# Patient Record
Sex: FEMALE | Race: WHITE | NOT HISPANIC OR LATINO
[De-identification: names, ages, dates, MRNs, and addresses within clinical notes are randomized per-mention and may not be internally consistent; named-entity substitution may affect disease eponyms.]

---

## 2016-12-23 RX ORDER — SODIUM CHLORIDE 9 MG/ML
3 INJECTION INTRAMUSCULAR; INTRAVENOUS; SUBCUTANEOUS ONCE
Qty: 0 | Refills: 0 | Status: DISCONTINUED | OUTPATIENT
Start: 2017-01-03 | End: 2017-01-18

## 2017-01-03 ENCOUNTER — APPOINTMENT (OUTPATIENT)
Dept: UROGYNECOLOGY | Facility: HOSPITAL | Age: 82
End: 2017-01-03

## 2017-01-03 ENCOUNTER — OUTPATIENT (OUTPATIENT)
Dept: OUTPATIENT SERVICES | Facility: HOSPITAL | Age: 82
LOS: 1 days | End: 2017-01-03
Payer: MEDICARE

## 2017-01-03 VITALS
SYSTOLIC BLOOD PRESSURE: 109 MMHG | HEART RATE: 62 BPM | OXYGEN SATURATION: 93 % | RESPIRATION RATE: 16 BRPM | DIASTOLIC BLOOD PRESSURE: 59 MMHG

## 2017-01-03 VITALS
RESPIRATION RATE: 16 BRPM | WEIGHT: 175.05 LBS | SYSTOLIC BLOOD PRESSURE: 143 MMHG | HEIGHT: 70 IN | TEMPERATURE: 98 F | HEART RATE: 51 BPM | OXYGEN SATURATION: 97 % | DIASTOLIC BLOOD PRESSURE: 60 MMHG

## 2017-01-03 DIAGNOSIS — Z98.890 OTHER SPECIFIED POSTPROCEDURAL STATES: Chronic | ICD-10-CM

## 2017-01-03 DIAGNOSIS — Z90.49 ACQUIRED ABSENCE OF OTHER SPECIFIED PARTS OF DIGESTIVE TRACT: Chronic | ICD-10-CM

## 2017-01-03 DIAGNOSIS — N39.3 STRESS INCONTINENCE (FEMALE) (MALE): ICD-10-CM

## 2017-01-03 PROCEDURE — 57288 REPAIR BLADDER DEFECT: CPT

## 2017-01-03 PROCEDURE — C1771: CPT

## 2017-01-03 RX ORDER — SODIUM CHLORIDE 9 MG/ML
1000 INJECTION INTRAMUSCULAR; INTRAVENOUS; SUBCUTANEOUS
Qty: 0 | Refills: 0 | Status: DISCONTINUED | OUTPATIENT
Start: 2017-01-03 | End: 2017-01-03

## 2017-01-03 RX ORDER — CEFAZOLIN SODIUM 1 G
2000 VIAL (EA) INJECTION ONCE
Qty: 0 | Refills: 0 | Status: COMPLETED | OUTPATIENT
Start: 2017-01-03 | End: 2017-01-03

## 2017-01-03 RX ORDER — ONDANSETRON 8 MG/1
4 TABLET, FILM COATED ORAL ONCE
Qty: 0 | Refills: 0 | Status: DISCONTINUED | OUTPATIENT
Start: 2017-01-03 | End: 2017-01-03

## 2017-01-03 RX ORDER — OXYCODONE HYDROCHLORIDE 5 MG/1
1 TABLET ORAL
Qty: 15 | Refills: 0 | OUTPATIENT
Start: 2017-01-03

## 2017-01-03 RX ORDER — FENTANYL CITRATE 50 UG/ML
25 INJECTION INTRAVENOUS
Qty: 0 | Refills: 0 | Status: DISCONTINUED | OUTPATIENT
Start: 2017-01-03 | End: 2017-01-03

## 2017-01-03 RX ORDER — HYDROMORPHONE HYDROCHLORIDE 2 MG/ML
0.5 INJECTION INTRAMUSCULAR; INTRAVENOUS; SUBCUTANEOUS
Qty: 0 | Refills: 0 | Status: DISCONTINUED | OUTPATIENT
Start: 2017-01-03 | End: 2017-01-03

## 2017-01-03 RX ADMIN — Medication 100 MILLIGRAM(S): at 12:11

## 2017-01-03 NOTE — ASU DISCHARGE PLAN (ADULT/PEDIATRIC). - MEDICATION SUMMARY - MEDICATIONS TO TAKE
I will START or STAY ON the medications listed below when I get home from the hospital:    aspirin 81 mg oral delayed release tablet  -- 1 tab(s) by mouth once a day  -- Indication: For as per pmd     losartan 50 mg oral tablet  -- 1 tab(s) by mouth once a day  -- Indication: For as per pmd     simvastatin 20 mg oral tablet  -- 1 tab(s) by mouth once a day (at bedtime)  -- Indication: For as prior to surgery     amLODIPine 5 mg oral tablet  -- 1 tab(s) by mouth once a day  -- Indication: For as per pmd     Zantac 150 oral tablet  -- 1 tab(s) by mouth once a day  -- Indication: For as prior to surgery     Fish Oil 1000 mg oral capsule  -- 1 cap(s) by mouth once a day  -- Indication: For Supplement     PriLOSEC 40 mg oral delayed release capsule  -- 1 cap(s) by mouth once a day  -- Indication: For as per pmd     Vitamin D3 1000 intl units oral tablet  -- 1 tab(s) by mouth once a day  -- Indication: For Supplement

## 2017-01-03 NOTE — BRIEF OPERATIVE NOTE - PROCEDURE
Sling operation for female stress incontinence  01/03/2017  retropubic sling  Active  [facility]

## 2017-01-19 ENCOUNTER — APPOINTMENT (OUTPATIENT)
Dept: UROGYNECOLOGY | Facility: CLINIC | Age: 82
End: 2017-01-19

## 2017-01-19 ENCOUNTER — RESULT CHARGE (OUTPATIENT)
Age: 82
End: 2017-01-19

## 2017-01-19 VITALS
DIASTOLIC BLOOD PRESSURE: 76 MMHG | WEIGHT: 178 LBS | BODY MASS INDEX: 34.95 KG/M2 | SYSTOLIC BLOOD PRESSURE: 182 MMHG | HEIGHT: 60 IN

## 2017-01-19 LAB
BILIRUB UR QL STRIP: NEGATIVE
CLARITY UR: CLEAR
COLLECTION METHOD: NORMAL
GLUCOSE UR-MCNC: NEGATIVE
HCG UR QL: 0.2 EU/DL
HGB UR QL STRIP.AUTO: NORMAL
KETONES UR-MCNC: NEGATIVE
LEUKOCYTE ESTERASE UR QL STRIP: NORMAL
NITRITE UR QL STRIP: NEGATIVE
PH UR STRIP: 6.5
PROT UR STRIP-MCNC: NEGATIVE
SP GR UR STRIP: 1.01

## 2017-02-16 ENCOUNTER — APPOINTMENT (OUTPATIENT)
Dept: UROGYNECOLOGY | Facility: CLINIC | Age: 82
End: 2017-02-16
Payer: MEDICARE

## 2017-02-16 VITALS
SYSTOLIC BLOOD PRESSURE: 167 MMHG | WEIGHT: 178 LBS | BODY MASS INDEX: 34.95 KG/M2 | HEIGHT: 60 IN | DIASTOLIC BLOOD PRESSURE: 85 MMHG

## 2017-02-16 DIAGNOSIS — N39.3 STRESS INCONTINENCE (FEMALE) (MALE): ICD-10-CM

## 2017-02-16 DIAGNOSIS — R39.15 URGENCY OF URINATION: ICD-10-CM

## 2017-02-16 LAB
BILIRUB UR QL STRIP: NEGATIVE
GLUCOSE UR-MCNC: NEGATIVE
HCG UR QL: 0.2 EU/DL
HGB UR QL STRIP.AUTO: NEGATIVE
KETONES UR-MCNC: NEGATIVE
LEUKOCYTE ESTERASE UR QL STRIP: NORMAL
NITRITE UR QL STRIP: NEGATIVE
PH UR STRIP: 6
PROT UR STRIP-MCNC: NEGATIVE
SP GR UR STRIP: 1.01

## 2017-02-16 PROCEDURE — 99024 POSTOP FOLLOW-UP VISIT: CPT

## 2017-02-16 PROCEDURE — 81003 URINALYSIS AUTO W/O SCOPE: CPT | Mod: QW

## 2017-04-10 ENCOUNTER — RX RENEWAL (OUTPATIENT)
Age: 82
End: 2017-04-10

## 2017-05-04 ENCOUNTER — APPOINTMENT (OUTPATIENT)
Dept: UROGYNECOLOGY | Facility: CLINIC | Age: 82
End: 2017-05-04

## 2017-05-04 VITALS
SYSTOLIC BLOOD PRESSURE: 170 MMHG | DIASTOLIC BLOOD PRESSURE: 69 MMHG | BODY MASS INDEX: 33.61 KG/M2 | HEIGHT: 61 IN | WEIGHT: 178 LBS

## 2017-05-04 LAB
BILIRUB UR QL STRIP: NEGATIVE
CLARITY UR: CLEAR
COLLECTION METHOD: NORMAL
GLUCOSE UR-MCNC: NEGATIVE
HCG UR QL: 0.2 EU/DL
HGB UR QL STRIP.AUTO: NORMAL
KETONES UR-MCNC: NEGATIVE
LEUKOCYTE ESTERASE UR QL STRIP: NEGATIVE
NITRITE UR QL STRIP: NEGATIVE
PH UR STRIP: 6
PROT UR STRIP-MCNC: NEGATIVE
SP GR UR STRIP: 1

## 2017-05-09 ENCOUNTER — APPOINTMENT (OUTPATIENT)
Dept: PULMONOLOGY | Facility: CLINIC | Age: 82
End: 2017-05-09

## 2017-05-09 VITALS
SYSTOLIC BLOOD PRESSURE: 138 MMHG | HEART RATE: 53 BPM | DIASTOLIC BLOOD PRESSURE: 80 MMHG | OXYGEN SATURATION: 95 % | BODY MASS INDEX: 33.07 KG/M2 | WEIGHT: 175 LBS | RESPIRATION RATE: 16 BRPM

## 2017-05-09 DIAGNOSIS — R91.1 SOLITARY PULMONARY NODULE: ICD-10-CM

## 2017-05-09 RX ORDER — OXYCODONE AND ACETAMINOPHEN 5; 325 MG/1; MG/1
5-325 TABLET ORAL
Qty: 15 | Refills: 0 | Status: DISCONTINUED | COMMUNITY
Start: 2017-01-03 | End: 2017-05-09

## 2017-06-15 ENCOUNTER — RX RENEWAL (OUTPATIENT)
Age: 82
End: 2017-06-15

## 2017-06-22 ENCOUNTER — RX RENEWAL (OUTPATIENT)
Age: 82
End: 2017-06-22

## 2017-06-28 RX ORDER — SOLIFENACIN SUCCINATE 5 MG/1
5 TABLET, FILM COATED ORAL
Qty: 30 | Refills: 3 | Status: DISCONTINUED | COMMUNITY
Start: 2017-06-22 | End: 2017-06-28

## 2017-06-28 RX ORDER — OXYBUTYNIN CHLORIDE 5 MG/1
5 TABLET, EXTENDED RELEASE ORAL
Qty: 30 | Refills: 2 | Status: DISCONTINUED | COMMUNITY
Start: 2017-02-16 | End: 2017-06-28

## 2017-10-05 ENCOUNTER — APPOINTMENT (OUTPATIENT)
Dept: UROGYNECOLOGY | Facility: CLINIC | Age: 82
End: 2017-10-05
Payer: MEDICARE

## 2017-10-05 DIAGNOSIS — N39.41 URGE INCONTINENCE: ICD-10-CM

## 2017-10-05 DIAGNOSIS — R35.0 FREQUENCY OF MICTURITION: ICD-10-CM

## 2017-10-05 LAB
BILIRUB UR QL STRIP: NEGATIVE
CLARITY UR: CLEAR
COLLECTION METHOD: NORMAL
GLUCOSE UR-MCNC: NEGATIVE
HCG UR QL: 0.2 EU/DL
HGB UR QL STRIP.AUTO: NEGATIVE
KETONES UR-MCNC: NEGATIVE
LEUKOCYTE ESTERASE UR QL STRIP: NEGATIVE
NITRITE UR QL STRIP: NEGATIVE
PH UR STRIP: 5.5
PROT UR STRIP-MCNC: NEGATIVE
SP GR UR STRIP: <=1.005

## 2017-10-05 PROCEDURE — 99213 OFFICE O/P EST LOW 20 MIN: CPT | Mod: 25

## 2017-10-05 PROCEDURE — 51798 US URINE CAPACITY MEASURE: CPT

## 2017-10-05 PROCEDURE — 81003 URINALYSIS AUTO W/O SCOPE: CPT | Mod: QW

## 2018-05-02 ENCOUNTER — APPOINTMENT (OUTPATIENT)
Dept: PULMONOLOGY | Facility: CLINIC | Age: 83
End: 2018-05-02
Payer: MEDICARE

## 2018-05-02 VITALS
DIASTOLIC BLOOD PRESSURE: 78 MMHG | WEIGHT: 171.5 LBS | SYSTOLIC BLOOD PRESSURE: 122 MMHG | BODY MASS INDEX: 32.4 KG/M2 | OXYGEN SATURATION: 95 % | HEART RATE: 62 BPM

## 2018-05-02 VITALS — RESPIRATION RATE: 16 BRPM

## 2018-05-02 PROCEDURE — 99214 OFFICE O/P EST MOD 30 MIN: CPT

## 2018-05-02 RX ORDER — PENICILLIN V POTASSIUM 500 MG/1
500 TABLET, FILM COATED ORAL
Qty: 21 | Refills: 0 | Status: DISCONTINUED | COMMUNITY
Start: 2018-04-06

## 2018-05-02 RX ORDER — CEFDINIR 300 MG/1
300 CAPSULE ORAL
Qty: 20 | Refills: 0 | Status: DISCONTINUED | COMMUNITY
Start: 2018-01-03

## 2018-06-05 ENCOUNTER — APPOINTMENT (OUTPATIENT)
Dept: BREAST CENTER | Facility: CLINIC | Age: 83
End: 2018-06-05
Payer: MEDICARE

## 2018-06-05 VITALS
HEIGHT: 61 IN | SYSTOLIC BLOOD PRESSURE: 142 MMHG | BODY MASS INDEX: 31.34 KG/M2 | WEIGHT: 166 LBS | DIASTOLIC BLOOD PRESSURE: 80 MMHG | HEART RATE: 68 BPM

## 2018-06-05 DIAGNOSIS — Z80.1 FAMILY HISTORY OF MALIGNANT NEOPLASM OF TRACHEA, BRONCHUS AND LUNG: ICD-10-CM

## 2018-06-05 DIAGNOSIS — Z87.19 PERSONAL HISTORY OF OTHER DISEASES OF THE DIGESTIVE SYSTEM: ICD-10-CM

## 2018-06-05 DIAGNOSIS — Z87.39 PERSONAL HISTORY OF OTHER DISEASES OF THE MUSCULOSKELETAL SYSTEM AND CONNECTIVE TISSUE: ICD-10-CM

## 2018-06-05 PROCEDURE — 99205 OFFICE O/P NEW HI 60 MIN: CPT

## 2018-06-05 RX ORDER — OMEGA-3/DHA/EPA/FISH OIL 300-1000MG
1000 CAPSULE ORAL
Refills: 0 | Status: ACTIVE | COMMUNITY

## 2018-06-05 RX ORDER — BENZONATATE 100 MG/1
100 CAPSULE ORAL
Qty: 21 | Refills: 0 | Status: DISCONTINUED | COMMUNITY
Start: 2017-09-25 | End: 2018-06-05

## 2018-06-08 ENCOUNTER — RESULT REVIEW (OUTPATIENT)
Age: 83
End: 2018-06-08

## 2018-06-08 ENCOUNTER — OUTPATIENT (OUTPATIENT)
Dept: OUTPATIENT SERVICES | Facility: HOSPITAL | Age: 83
LOS: 1 days | Discharge: HOME | End: 2018-06-08
Payer: MEDICARE

## 2018-06-08 DIAGNOSIS — C50.111 MALIGNANT NEOPLASM OF CENTRAL PORTION OF RIGHT FEMALE BREAST: ICD-10-CM

## 2018-06-08 DIAGNOSIS — Z98.890 OTHER SPECIFIED POSTPROCEDURAL STATES: Chronic | ICD-10-CM

## 2018-06-08 DIAGNOSIS — Z90.49 ACQUIRED ABSENCE OF OTHER SPECIFIED PARTS OF DIGESTIVE TRACT: Chronic | ICD-10-CM

## 2018-06-08 PROCEDURE — 88321 CONSLTJ&REPRT SLD PREP ELSWR: CPT

## 2018-06-11 LAB — SURGICAL PATHOLOGY FINAL REPORT - CH: SIGNIFICANT CHANGE UP

## 2018-06-12 ENCOUNTER — OUTPATIENT (OUTPATIENT)
Dept: OUTPATIENT SERVICES | Facility: HOSPITAL | Age: 83
LOS: 1 days | Discharge: HOME | End: 2018-06-12
Payer: MEDICARE

## 2018-06-12 DIAGNOSIS — Z98.890 OTHER SPECIFIED POSTPROCEDURAL STATES: Chronic | ICD-10-CM

## 2018-06-12 DIAGNOSIS — E78.00 PURE HYPERCHOLESTEROLEMIA, UNSPECIFIED: ICD-10-CM

## 2018-06-12 DIAGNOSIS — N60.31 FIBROSCLEROSIS OF RIGHT BREAST: ICD-10-CM

## 2018-06-12 DIAGNOSIS — C50.911 MALIGNANT NEOPLASM OF UNSPECIFIED SITE OF RIGHT FEMALE BREAST: ICD-10-CM

## 2018-06-12 DIAGNOSIS — C50.111 MALIGNANT NEOPLASM OF CENTRAL PORTION OF RIGHT FEMALE BREAST: ICD-10-CM

## 2018-06-12 DIAGNOSIS — G47.33 OBSTRUCTIVE SLEEP APNEA (ADULT) (PEDIATRIC): ICD-10-CM

## 2018-06-12 DIAGNOSIS — Z98.49 CATARACT EXTRACTION STATUS, UNSPECIFIED EYE: Chronic | ICD-10-CM

## 2018-06-12 DIAGNOSIS — Z01.818 ENCOUNTER FOR OTHER PREPROCEDURAL EXAMINATION: ICD-10-CM

## 2018-06-12 DIAGNOSIS — K59.00 CONSTIPATION, UNSPECIFIED: ICD-10-CM

## 2018-06-12 DIAGNOSIS — R91.1 SOLITARY PULMONARY NODULE: ICD-10-CM

## 2018-06-12 DIAGNOSIS — E66.9 OBESITY, UNSPECIFIED: ICD-10-CM

## 2018-06-12 DIAGNOSIS — Z90.49 ACQUIRED ABSENCE OF OTHER SPECIFIED PARTS OF DIGESTIVE TRACT: Chronic | ICD-10-CM

## 2018-06-12 LAB
ANION GAP SERPL CALC-SCNC: 6 MMOL/L — SIGNIFICANT CHANGE UP (ref 5–17)
BASOPHILS # BLD AUTO: 0.02 K/UL — SIGNIFICANT CHANGE UP (ref 0–0.2)
BASOPHILS NFR BLD AUTO: 0.3 % — SIGNIFICANT CHANGE UP (ref 0–2)
BUN SERPL-MCNC: 17 MG/DL — SIGNIFICANT CHANGE UP (ref 7–23)
CALCIUM SERPL-MCNC: 8.9 MG/DL — SIGNIFICANT CHANGE UP (ref 8.5–10.1)
CHLORIDE SERPL-SCNC: 107 MMOL/L — SIGNIFICANT CHANGE UP (ref 96–108)
CO2 SERPL-SCNC: 29 MMOL/L — SIGNIFICANT CHANGE UP (ref 22–31)
CREAT SERPL-MCNC: 0.68 MG/DL — SIGNIFICANT CHANGE UP (ref 0.5–1.3)
EOSINOPHIL # BLD AUTO: 0.12 K/UL — SIGNIFICANT CHANGE UP (ref 0–0.5)
EOSINOPHIL NFR BLD AUTO: 1.7 % — SIGNIFICANT CHANGE UP (ref 0–6)
GLUCOSE SERPL-MCNC: 98 MG/DL — SIGNIFICANT CHANGE UP (ref 70–99)
HCT VFR BLD CALC: 43 % — SIGNIFICANT CHANGE UP (ref 34.5–45)
HGB BLD-MCNC: 14.4 G/DL — SIGNIFICANT CHANGE UP (ref 11.5–15.5)
IMM GRANULOCYTES NFR BLD AUTO: 0.3 % — SIGNIFICANT CHANGE UP (ref 0–1.5)
LYMPHOCYTES # BLD AUTO: 1.99 K/UL — SIGNIFICANT CHANGE UP (ref 1–3.3)
LYMPHOCYTES # BLD AUTO: 28.2 % — SIGNIFICANT CHANGE UP (ref 13–44)
MCHC RBC-ENTMCNC: 29.6 PG — SIGNIFICANT CHANGE UP (ref 27–34)
MCHC RBC-ENTMCNC: 33.5 GM/DL — SIGNIFICANT CHANGE UP (ref 32–36)
MCV RBC AUTO: 88.5 FL — SIGNIFICANT CHANGE UP (ref 80–100)
MONOCYTES # BLD AUTO: 0.49 K/UL — SIGNIFICANT CHANGE UP (ref 0–0.9)
MONOCYTES NFR BLD AUTO: 7 % — SIGNIFICANT CHANGE UP (ref 2–14)
NEUTROPHILS # BLD AUTO: 4.41 K/UL — SIGNIFICANT CHANGE UP (ref 1.8–7.4)
NEUTROPHILS NFR BLD AUTO: 62.5 % — SIGNIFICANT CHANGE UP (ref 43–77)
NRBC # BLD: 0 /100 WBCS — SIGNIFICANT CHANGE UP (ref 0–0)
PLATELET # BLD AUTO: 164 K/UL — SIGNIFICANT CHANGE UP (ref 150–400)
POTASSIUM SERPL-MCNC: 4.1 MMOL/L — SIGNIFICANT CHANGE UP (ref 3.5–5.3)
POTASSIUM SERPL-SCNC: 4.1 MMOL/L — SIGNIFICANT CHANGE UP (ref 3.5–5.3)
RBC # BLD: 4.86 M/UL — SIGNIFICANT CHANGE UP (ref 3.8–5.2)
RBC # FLD: 13.3 % — SIGNIFICANT CHANGE UP (ref 10.3–14.5)
SODIUM SERPL-SCNC: 142 MMOL/L — SIGNIFICANT CHANGE UP (ref 135–145)
WBC # BLD: 7.05 K/UL — SIGNIFICANT CHANGE UP (ref 3.8–10.5)
WBC # FLD AUTO: 7.05 K/UL — SIGNIFICANT CHANGE UP (ref 3.8–10.5)

## 2018-06-12 PROCEDURE — 71046 X-RAY EXAM CHEST 2 VIEWS: CPT | Mod: 26

## 2018-06-12 PROCEDURE — 93010 ELECTROCARDIOGRAM REPORT: CPT

## 2018-06-12 NOTE — ASU PATIENT PROFILE, ADULT - PATIENT'S HEIGHT AND WEIGHT RECORDED IN THE VITAL SIGNS FLOWSHEET
HT 60 inches WY 173lbs  BP  183/68  T 98.2  P 52  R 20  O2sat 100%/yes yes/HT 60 inches WT 173lbs  BP  183/68  T 98.2  P 52  R 20  O2sat 100%

## 2018-06-12 NOTE — ASU PATIENT PROFILE, ADULT - PSH
H/O bilateral breast biopsy  1995 benign  H/O colonoscopy with polypectomy    History of bladder surgery  sling 2016  History of cataract surgery  b/l  History of esophagogastroduodenoscopy (EGD)    S/P appendectomy    S/P carpal tunnel release  right hand 10/2016  S/P coronary angiogram  catherization done 8/12/2016 no stents

## 2018-06-12 NOTE — CHART NOTE - NSCHARTNOTEFT_GEN_A_CORE
Plan  1. Stop all NSAIDS, herbal supplements and vitamins for 7 days.  2. NPO at midnight.  3. Take the following medications ( amlodipine, prilosec, losartan ) with small sips of water on the morning of your procedure/surgery.  4. Use EZ sponges as directed

## 2018-06-12 NOTE — ASU PATIENT PROFILE, ADULT - PMH
Breast calcifications    Breast neoplasm  right  GERD (gastroesophageal reflux disease)    Heart murmur    Hyperlipidemia    Hypertension    OA (osteoarthritis)    Obesity    JORGE on CPAP    Rhinitis    Stress incontinence in female

## 2018-06-14 DIAGNOSIS — C50.111 MALIGNANT NEOPLASM OF CENTRAL PORTION OF RIGHT FEMALE BREAST: ICD-10-CM

## 2018-06-19 RX ORDER — FENTANYL CITRATE 50 UG/ML
50 INJECTION INTRAVENOUS
Qty: 0 | Refills: 0 | Status: DISCONTINUED | OUTPATIENT
Start: 2018-06-20 | End: 2018-06-20

## 2018-06-19 RX ORDER — OXYCODONE HYDROCHLORIDE 5 MG/1
10 TABLET ORAL EVERY 6 HOURS
Qty: 0 | Refills: 0 | Status: DISCONTINUED | OUTPATIENT
Start: 2018-06-20 | End: 2018-06-20

## 2018-06-20 ENCOUNTER — RESULT REVIEW (OUTPATIENT)
Age: 83
End: 2018-06-20

## 2018-06-20 ENCOUNTER — APPOINTMENT (OUTPATIENT)
Dept: BREAST CENTER | Facility: HOSPITAL | Age: 83
End: 2018-06-20
Payer: MEDICARE

## 2018-06-20 ENCOUNTER — OUTPATIENT (OUTPATIENT)
Dept: OUTPATIENT SERVICES | Facility: HOSPITAL | Age: 83
LOS: 1 days | Discharge: HOME | End: 2018-06-20
Payer: MEDICARE

## 2018-06-20 VITALS
RESPIRATION RATE: 15 BRPM | HEART RATE: 68 BPM | OXYGEN SATURATION: 95 % | DIASTOLIC BLOOD PRESSURE: 73 MMHG | TEMPERATURE: 98 F | SYSTOLIC BLOOD PRESSURE: 138 MMHG

## 2018-06-20 VITALS
OXYGEN SATURATION: 97 % | HEART RATE: 52 BPM | TEMPERATURE: 98 F | DIASTOLIC BLOOD PRESSURE: 64 MMHG | WEIGHT: 164.02 LBS | SYSTOLIC BLOOD PRESSURE: 142 MMHG | RESPIRATION RATE: 16 BRPM | HEIGHT: 61 IN

## 2018-06-20 DIAGNOSIS — Z98.890 OTHER SPECIFIED POSTPROCEDURAL STATES: Chronic | ICD-10-CM

## 2018-06-20 DIAGNOSIS — Z98.49 CATARACT EXTRACTION STATUS, UNSPECIFIED EYE: Chronic | ICD-10-CM

## 2018-06-20 DIAGNOSIS — Z90.49 ACQUIRED ABSENCE OF OTHER SPECIFIED PARTS OF DIGESTIVE TRACT: Chronic | ICD-10-CM

## 2018-06-20 PROCEDURE — 19301 PARTIAL MASTECTOMY: CPT | Mod: RT

## 2018-06-20 PROCEDURE — 88305 TISSUE EXAM BY PATHOLOGIST: CPT | Mod: 26

## 2018-06-20 PROCEDURE — 88307 TISSUE EXAM BY PATHOLOGIST: CPT | Mod: 26

## 2018-06-20 PROCEDURE — 38525 BIOPSY/REMOVAL LYMPH NODES: CPT | Mod: RT

## 2018-06-20 PROCEDURE — 88329 PATH CONSLTJ DRG SURG: CPT

## 2018-06-20 PROCEDURE — 38792 RA TRACER ID OF SENTINL NODE: CPT | Mod: RT

## 2018-06-20 PROCEDURE — 78806: CPT | Mod: 26

## 2018-06-20 RX ORDER — ONDANSETRON 8 MG/1
4 TABLET, FILM COATED ORAL ONCE
Qty: 0 | Refills: 0 | Status: DISCONTINUED | OUTPATIENT
Start: 2018-06-20 | End: 2018-06-20

## 2018-06-20 RX ORDER — OXYCODONE AND ACETAMINOPHEN 5; 325 MG/1; MG/1
1 TABLET ORAL EVERY 4 HOURS
Qty: 0 | Refills: 0 | Status: DISCONTINUED | OUTPATIENT
Start: 2018-06-20 | End: 2018-06-20

## 2018-06-20 RX ORDER — OXYCODONE AND ACETAMINOPHEN 5; 325 MG/1; MG/1
2 TABLET ORAL EVERY 6 HOURS
Qty: 0 | Refills: 0 | Status: DISCONTINUED | OUTPATIENT
Start: 2018-06-20 | End: 2018-06-20

## 2018-06-20 RX ORDER — SODIUM CHLORIDE 9 MG/ML
1000 INJECTION, SOLUTION INTRAVENOUS
Qty: 0 | Refills: 0 | Status: DISCONTINUED | OUTPATIENT
Start: 2018-06-20 | End: 2018-06-20

## 2018-06-20 RX ORDER — CETIRIZINE HYDROCHLORIDE 10 MG/1
1 TABLET ORAL
Qty: 0 | Refills: 0 | COMMUNITY

## 2018-06-20 RX ORDER — ONDANSETRON 8 MG/1
4 TABLET, FILM COATED ORAL EVERY 6 HOURS
Qty: 0 | Refills: 0 | Status: DISCONTINUED | OUTPATIENT
Start: 2018-06-20 | End: 2018-07-05

## 2018-06-20 RX ORDER — OXYCODONE HYDROCHLORIDE 5 MG/1
1 TABLET ORAL
Qty: 20 | Refills: 0 | OUTPATIENT
Start: 2018-06-20

## 2018-06-20 RX ADMIN — OXYCODONE HYDROCHLORIDE 10 MILLIGRAM(S): 5 TABLET ORAL at 14:44

## 2018-06-20 RX ADMIN — FENTANYL CITRATE 50 MICROGRAM(S): 50 INJECTION INTRAVENOUS at 14:43

## 2018-06-20 NOTE — ASU DISCHARGE PLAN (ADULT/PEDIATRIC). - MEDICATION SUMMARY - MEDICATIONS TO STOP TAKING
I will STOP taking the medications listed below when I get home from the hospital:    aspirin 81 mg oral delayed release tablet  -- 1 tab(s) by mouth once a day  hold preop as per surgeon and PMD    Fish Oil 1000 mg oral capsule  -- 1 cap(s) by mouth once a day    Aleve 220 mg oral tablet  -- 1 tab(s) by mouth once a day, As Needed    hold 1 week before surgery

## 2018-06-20 NOTE — ASU DISCHARGE PLAN (ADULT/PEDIATRIC). - NOTIFY
Pain not relieved by Medications/Persistent Nausea and Vomiting/Unable to Urinate/Fever greater than 101/Bleeding that does not stop

## 2018-06-20 NOTE — BRIEF OPERATIVE NOTE - PROCEDURE
<<-----Click on this checkbox to enter Procedure [place] node mapping with biopsy  06/20/2018  Right axilla  Active  [facility]  Lumpectomy for malignant neoplasm of breast  06[facility]20/2018  [facility]

## 2018-06-20 NOTE — ASU DISCHARGE PLAN (ADULT/PEDIATRIC). - MEDICATION SUMMARY - MEDICATIONS TO TAKE
I will START or STAY ON the medications listed below when I get home from the hospital:    oxyCODONE 5 mg oral tablet  -- 1 -2 tab(s) by mouth every 4 hours, As Needed -for moderate pain MDD:12  -- Caution federal law prohibits the transfer of this drug to any person other  than the person for whom it was prescribed.  It is very important that you take or use this exactly as directed.  Do not skip doses or discontinue unless directed by your doctor.  May cause drowsiness.  Alcohol may intensify this effect.  Use care when operating dangerous machinery.  This prescription cannot be refilled.  Using more of this medication than prescribed may cause serious breathing problems.    -- Indication: For postop pain    losartan 50 mg oral tablet  -- 1 tab(s) by mouth once a day  -- Indication: For blood pressure    ZyrTEC 5 mg oral tablet  -- 1 tab(s) by mouth once a day, As Needed  -- Indication: For allergies    simvastatin 20 mg oral tablet  -- 1 tab(s) by mouth once a day (at bedtime)  -- Indication: For cholesterol    amLODIPine 5 mg oral tablet  -- 1 tab(s) by mouth once a day  -- Indication: For blood pressure    Zantac 150 oral tablet  -- 1 tab(s) by mouth once a day  -- Indication: For reflux    PriLOSEC 40 mg oral delayed release capsule  -- 1 cap(s) by mouth once a day  -- Indication: For reflux    Citracal + D  -- 1 tab(s) by mouth once a day  -- Indication: For supplement    Vitamin D3 1000 intl units oral tablet  -- 1 tab(s) by mouth once a day  -- Indication: For  vitamin

## 2018-06-20 NOTE — BRIEF OPERATIVE NOTE - SPECIMENS
Right axillary sentinel nodes x 2, nonsentinel node x 1.   Right lumpectomy. Lumpectomy shave margins x 6.

## 2018-06-25 LAB — SURGICAL PATHOLOGY FINAL REPORT - CH: SIGNIFICANT CHANGE UP

## 2018-06-28 ENCOUNTER — APPOINTMENT (OUTPATIENT)
Dept: BREAST CENTER | Facility: CLINIC | Age: 83
End: 2018-06-28
Payer: MEDICARE

## 2018-06-28 PROCEDURE — 99024 POSTOP FOLLOW-UP VISIT: CPT

## 2018-06-29 DIAGNOSIS — N60.31 FIBROSCLEROSIS OF RIGHT BREAST: ICD-10-CM

## 2018-06-29 DIAGNOSIS — G47.33 OBSTRUCTIVE SLEEP APNEA (ADULT) (PEDIATRIC): ICD-10-CM

## 2018-06-29 DIAGNOSIS — Z79.82 LONG TERM (CURRENT) USE OF ASPIRIN: ICD-10-CM

## 2018-06-29 DIAGNOSIS — Z90.49 ACQUIRED ABSENCE OF OTHER SPECIFIED PARTS OF DIGESTIVE TRACT: ICD-10-CM

## 2018-06-29 DIAGNOSIS — C50.911 MALIGNANT NEOPLASM OF UNSPECIFIED SITE OF RIGHT FEMALE BREAST: ICD-10-CM

## 2018-06-29 DIAGNOSIS — E78.00 PURE HYPERCHOLESTEROLEMIA, UNSPECIFIED: ICD-10-CM

## 2018-06-29 DIAGNOSIS — K59.00 CONSTIPATION, UNSPECIFIED: ICD-10-CM

## 2018-06-29 DIAGNOSIS — R91.1 SOLITARY PULMONARY NODULE: ICD-10-CM

## 2018-06-29 DIAGNOSIS — Z79.1 LONG TERM (CURRENT) USE OF NON-STEROIDAL ANTI-INFLAMMATORIES (NSAID): ICD-10-CM

## 2018-06-29 DIAGNOSIS — I10 ESSENTIAL (PRIMARY) HYPERTENSION: ICD-10-CM

## 2018-06-29 DIAGNOSIS — Z80.1 FAMILY HISTORY OF MALIGNANT NEOPLASM OF TRACHEA, BRONCHUS AND LUNG: ICD-10-CM

## 2018-06-29 DIAGNOSIS — Z80.3 FAMILY HISTORY OF MALIGNANT NEOPLASM OF BREAST: ICD-10-CM

## 2018-06-29 DIAGNOSIS — E66.9 OBESITY, UNSPECIFIED: ICD-10-CM

## 2018-06-29 DIAGNOSIS — K21.9 GASTRO-ESOPHAGEAL REFLUX DISEASE WITHOUT ESOPHAGITIS: ICD-10-CM

## 2018-06-29 DIAGNOSIS — M19.90 UNSPECIFIED OSTEOARTHRITIS, UNSPECIFIED SITE: ICD-10-CM

## 2018-07-24 ENCOUNTER — MEDICATION RENEWAL (OUTPATIENT)
Age: 83
End: 2018-07-24

## 2018-07-25 ENCOUNTER — RESULT REVIEW (OUTPATIENT)
Age: 83
End: 2018-07-25

## 2018-07-27 PROBLEM — Z98.890 POST-OPERATIVE STATE: Status: RESOLVED | Noted: 2017-01-19 | Resolved: 2018-07-27

## 2018-07-27 PROBLEM — Z80.2 FAMILY HISTORY OF MALIGNANT NEOPLASM OF LARYNX: Status: ACTIVE | Noted: 2018-07-27

## 2018-07-30 ENCOUNTER — APPOINTMENT (OUTPATIENT)
Dept: HEMATOLOGY ONCOLOGY | Facility: CLINIC | Age: 83
End: 2018-07-30
Payer: MEDICARE

## 2018-07-30 VITALS
BODY MASS INDEX: 32.66 KG/M2 | WEIGHT: 173 LBS | HEART RATE: 54 BPM | DIASTOLIC BLOOD PRESSURE: 68 MMHG | HEIGHT: 61 IN | TEMPERATURE: 98.4 F | SYSTOLIC BLOOD PRESSURE: 174 MMHG

## 2018-07-30 DIAGNOSIS — Z80.3 FAMILY HISTORY OF MALIGNANT NEOPLASM OF BREAST: ICD-10-CM

## 2018-07-30 DIAGNOSIS — Z98.890 OTHER SPECIFIED POSTPROCEDURAL STATES: ICD-10-CM

## 2018-07-30 DIAGNOSIS — Z87.19 PERSONAL HISTORY OF OTHER DISEASES OF THE DIGESTIVE SYSTEM: ICD-10-CM

## 2018-07-30 DIAGNOSIS — Z87.09 PERSONAL HISTORY OF OTHER DISEASES OF THE RESPIRATORY SYSTEM: ICD-10-CM

## 2018-07-30 DIAGNOSIS — Z80.2 FAMILY HISTORY OF MALIGNANT NEOPLASM OF OTHER RESPIRATORY AND INTRATHORACIC ORGANS: ICD-10-CM

## 2018-07-30 PROBLEM — M19.90 UNSPECIFIED OSTEOARTHRITIS, UNSPECIFIED SITE: Chronic | Status: ACTIVE | Noted: 2018-06-12

## 2018-07-30 PROBLEM — E66.9 OBESITY, UNSPECIFIED: Chronic | Status: ACTIVE | Noted: 2018-06-12

## 2018-07-30 PROBLEM — D49.3 NEOPLASM OF UNSPECIFIED BEHAVIOR OF BREAST: Chronic | Status: ACTIVE | Noted: 2018-06-12

## 2018-07-30 PROCEDURE — 99204 OFFICE O/P NEW MOD 45 MIN: CPT

## 2018-07-30 RX ORDER — OXYBUTYNIN CHLORIDE 5 MG/1
5 TABLET, EXTENDED RELEASE ORAL
Qty: 30 | Refills: 0 | Status: DISCONTINUED | COMMUNITY
Start: 2017-06-28 | End: 2018-07-30

## 2018-07-30 RX ORDER — CODEINE PHOSPHATE AND GUAIFENESIN 10; 100 MG/5ML; MG/5ML
100-10 LIQUID ORAL
Qty: 240 | Refills: 0 | Status: DISCONTINUED | COMMUNITY
Start: 2018-01-03 | End: 2018-07-30

## 2018-08-02 PROBLEM — Z80.3 FH: BREAST CANCER: Status: ACTIVE | Noted: 2018-06-05

## 2018-08-31 ENCOUNTER — APPOINTMENT (OUTPATIENT)
Dept: HEMATOLOGY ONCOLOGY | Facility: CLINIC | Age: 83
End: 2018-08-31
Payer: MEDICARE

## 2018-09-17 ENCOUNTER — APPOINTMENT (OUTPATIENT)
Dept: HEMATOLOGY ONCOLOGY | Facility: CLINIC | Age: 83
End: 2018-09-17
Payer: MEDICARE

## 2018-09-17 VITALS
WEIGHT: 176 LBS | HEIGHT: 61 IN | SYSTOLIC BLOOD PRESSURE: 132 MMHG | TEMPERATURE: 98.4 F | BODY MASS INDEX: 33.23 KG/M2 | DIASTOLIC BLOOD PRESSURE: 83 MMHG | HEART RATE: 73 BPM

## 2018-09-17 PROCEDURE — 99214 OFFICE O/P EST MOD 30 MIN: CPT

## 2018-11-13 ENCOUNTER — RESULT REVIEW (OUTPATIENT)
Age: 83
End: 2018-11-13

## 2018-12-04 ENCOUNTER — APPOINTMENT (OUTPATIENT)
Dept: BREAST CENTER | Facility: CLINIC | Age: 83
End: 2018-12-04
Payer: MEDICARE

## 2018-12-04 VITALS
SYSTOLIC BLOOD PRESSURE: 158 MMHG | WEIGHT: 174 LBS | DIASTOLIC BLOOD PRESSURE: 76 MMHG | HEART RATE: 74 BPM | HEIGHT: 60 IN | BODY MASS INDEX: 34.16 KG/M2

## 2018-12-04 PROCEDURE — 99214 OFFICE O/P EST MOD 30 MIN: CPT

## 2019-01-18 ENCOUNTER — APPOINTMENT (OUTPATIENT)
Dept: HEMATOLOGY ONCOLOGY | Facility: CLINIC | Age: 84
End: 2019-01-18
Payer: MEDICARE

## 2019-01-18 VITALS
HEART RATE: 61 BPM | BODY MASS INDEX: 34.36 KG/M2 | TEMPERATURE: 97.9 F | DIASTOLIC BLOOD PRESSURE: 73 MMHG | HEIGHT: 60 IN | WEIGHT: 175 LBS | SYSTOLIC BLOOD PRESSURE: 145 MMHG

## 2019-01-18 PROCEDURE — 99214 OFFICE O/P EST MOD 30 MIN: CPT

## 2019-01-18 RX ORDER — IBUPROFEN 200 MG/1
TABLET, COATED ORAL
Refills: 0 | Status: DISCONTINUED | COMMUNITY
End: 2019-01-18

## 2019-01-18 RX ORDER — NAPROXEN SODIUM 220 MG
TABLET ORAL
Refills: 0 | Status: DISCONTINUED | COMMUNITY
End: 2019-01-18

## 2019-01-18 NOTE — PHYSICAL EXAM
[Normal] : normoactive bowel sounds, soft and nontender, no hepatosplenomegaly or masses appreciated [de-identified] : looks very well for her age  [de-identified] : fibrotic  periareolar lumpectomy scar right breast and axillary scar,  post radiation skin changes ; left breast normal exam , no masses [de-identified] : left hand- wrapped in bandage ( post surgery)

## 2019-01-18 NOTE — ASSESSMENT
[FreeTextEntry1] : 84 y/o woman with  early stage breast cancer diagnosed in 2018 : s/p right breast lumpectomy / sentinel node biopsy for T 2 ( 2.1 cm) IDC strongly ER/MD positive and HER 2 negative, very low Oncotype recurrence score of 1: pathologic stage II A, prognostic stage I A\par S/p adjuvant radiation.\par \par On Arimidex since September 2018  , tolerating well. Continue Arimidex for 7 years.\par \par Patient has history of osteoporosis ( treated ) in the past.  \par Recent bone density- mild osteopenia- overall improved.\par Monitor. She will continue Calcium with vit D. Bone density in 2 years ( August 2020).\par \par Breast surveillance imaging per Dr Verduzco. \par \par return visit in 6 months. \par \par

## 2019-01-18 NOTE — HISTORY OF PRESENT ILLNESS
[Disease: _____________________] : Disease: [unfilled] [T: ___] : T[unfilled] [N: ___] : N[unfilled] [AJCC Stage: ____] : AJCC Stage: [unfilled] [de-identified] : 83 y/o woman presented with an abnormality in her right breast on a routine mammogram.\par On 6/20/2018 she underwent right breast lumpectomy and sentinel node biopsy.Pathology showed 2.1 cm mod diff infiltrating ductal cancer, strongly ER and NM positive, HER 2 negative, sentinel node negative. Oncotype recurrence score of 1.  Margins negative. \par \par Had adjuvant radiation. \par \par She has history of osteoporosis in the past and took bisphosphates several years ago. \par She  had bone density 8/8/18 : mild osteopenia left hip, normal in spine and right hip.  \par \par started Arimidex in September 2018. \par  [de-identified] : moderately differentiated infiltrating ductal cancer,  SBR 6/9, no LVI  [de-identified] : ER 98 %  NH 90 %  HER 2  0  neg    Oncotype recurrence score 1  ( 4 %)  [de-identified] : Feels well. Tolerating Arimidex without any adverse effects.\par Had surgery for carpal tunnel sz in her left hand this week. Pain is better.

## 2019-01-18 NOTE — REVIEW OF SYSTEMS
[Patient Intake Form Reviewed] : Patient intake form was reviewed [Loss of Hearing] : loss of hearing [Joint Pain] : joint pain [Joint Stiffness] : joint stiffness [Negative] : Allergic/Immunologic [FreeTextEntry9] : mild arthritic pains

## 2019-05-07 ENCOUNTER — APPOINTMENT (OUTPATIENT)
Dept: BREAST CENTER | Facility: CLINIC | Age: 84
End: 2019-05-07
Payer: MEDICARE

## 2019-05-07 VITALS
DIASTOLIC BLOOD PRESSURE: 75 MMHG | SYSTOLIC BLOOD PRESSURE: 148 MMHG | BODY MASS INDEX: 34.16 KG/M2 | WEIGHT: 174 LBS | HEART RATE: 55 BPM | HEIGHT: 60 IN

## 2019-05-07 PROCEDURE — 99214 OFFICE O/P EST MOD 30 MIN: CPT

## 2019-05-07 NOTE — DATA REVIEWED
[FreeTextEntry1] : Mammogram    5/1/18      Findings:  2 cm mass with irregular borders central medial right breast.\par \par Right breast ultrasound:  1.6 x 1.8 x 1.5 cm irregular mass at 1:00 N2 c/w mammographic finding.\par \par Ultrasound guided core biopsy right breast (1:00)   PATHOLOGY: Moderately differentiated, invasive ductal carcinoma. No LVI.  ER receptor - 98%, WA - 99%, HER2 - negative.\par \par Surgical Pathology:  6/20/18   Findings:  [place] nodes #1 and 2 - negative for malignancy. NOnsentinel node # 1 - negative.   Right lumpectomy;  2.1 cm moderately differentiated infiltrating ductal carcinoma.  NO LVI.  Margins of resection negative.   ER:  98%, WA - 90%, HER2 - negative.\par \par Mammogram:  05/01/19   Findings:  No suspicious findigns\par

## 2019-05-07 NOTE — HISTORY OF PRESENT ILLNESS
[FreeTextEntry1] : 83 year old female presents for a surveillance examination.  She underwent a right lumpectomy and sentinel node biopsy on  6/20/18 for stage 1A invasive ductal carcinoma.  Radiation therapy was completed in 09/18.

## 2019-05-07 NOTE — PHYSICAL EXAM
[Normocephalic] : normocephalic [Sclera nonicteric] : sclera nonicteric [Supple] : supple [No Supraclavicular Adenopathy] : no supraclavicular adenopathy [No Cervical Adenopathy] : no cervical adenopathy [No Thyromegaly] : no thyromegaly [No Rubs] : no pericardial rub [Clear to Auscultation Bilat] : clear to auscultation bilaterally [Examined in the supine and seated position] : examined in the supine and seated position [Symmetrical] : symmetrical [No dominant masses] : no dominant masses in right breast  [No dominant masses] : no dominant masses left breast [No Nipple Retraction] : no left nipple retraction [No Nipple Discharge] : no left nipple discharge [No Axillary Lymphadenopathy] : no left axillary lymphadenopathy [Soft] : abdomen soft [No Hepato-Splenomegaly] : no hepato-splenomegaly [No Edema] : no edema [No Rashes] : no rashes [No Ulceration] : no ulceration [Grade 2] : Ptosis Grade 2 [de-identified] : Periareolar incision at 12-1:00  Moderate postradiation edema and erythema. [de-identified] : Axillary incision healed

## 2019-05-07 NOTE — PAST MEDICAL HISTORY
[Postmenopausal] : The patient is postmenopausal [Total Preg ___] : G[unfilled] [Live Births ___] : P[unfilled]  [Age At Live Birth ___] : Age at live birth: [unfilled] [Menarche Age ____] : age at menarche was [unfilled] [History of Hormone Replacement Treatment] : has no history of hormone replacement treatment

## 2019-05-07 NOTE — CONSULT LETTER
[Dear  ___] : Dear ~JOHN, [Consult Letter:] : I had the pleasure of evaluating your patient, [unfilled]. [Please see my note below.] : Please see my note below. [Sincerely,] : Sincerely, [Consult Closing:] : Thank you very much for allowing me to participate in the care of this patient.  If you have any questions, please do not hesitate to contact me. [FreeTextEntry2] : Chirag Mary MD [FreeTextEntry3] : Jessica Verduzco MD FACS\par

## 2019-05-15 ENCOUNTER — NON-APPOINTMENT (OUTPATIENT)
Age: 84
End: 2019-05-15

## 2019-05-15 ENCOUNTER — APPOINTMENT (OUTPATIENT)
Dept: CARDIOLOGY | Facility: CLINIC | Age: 84
End: 2019-05-15
Payer: MEDICARE

## 2019-05-15 VITALS
RESPIRATION RATE: 16 BRPM | SYSTOLIC BLOOD PRESSURE: 130 MMHG | DIASTOLIC BLOOD PRESSURE: 83 MMHG | HEIGHT: 60 IN | HEART RATE: 57 BPM | BODY MASS INDEX: 34.55 KG/M2 | WEIGHT: 176 LBS

## 2019-05-15 PROCEDURE — 99204 OFFICE O/P NEW MOD 45 MIN: CPT

## 2019-05-15 PROCEDURE — 93000 ELECTROCARDIOGRAM COMPLETE: CPT

## 2019-07-02 NOTE — ASU PREOP CHECKLIST - IV STARTED
yes Area M Indication Text: Tumors in this location are included in Area M (cheek, forehead, scalp, neck, jawline and pretibial skin).  Mohs surgery is indicated for tumors in these anatomic locations.

## 2019-07-19 ENCOUNTER — APPOINTMENT (OUTPATIENT)
Dept: HEMATOLOGY ONCOLOGY | Facility: CLINIC | Age: 84
End: 2019-07-19
Payer: MEDICARE

## 2019-07-19 VITALS
HEIGHT: 60 IN | SYSTOLIC BLOOD PRESSURE: 157 MMHG | DIASTOLIC BLOOD PRESSURE: 74 MMHG | WEIGHT: 180 LBS | BODY MASS INDEX: 35.34 KG/M2 | TEMPERATURE: 97.5 F | HEART RATE: 60 BPM

## 2019-07-19 DIAGNOSIS — J31.0 CHRONIC RHINITIS: ICD-10-CM

## 2019-07-19 DIAGNOSIS — Z87.898 PERSONAL HISTORY OF OTHER SPECIFIED CONDITIONS: ICD-10-CM

## 2019-07-19 PROCEDURE — 99213 OFFICE O/P EST LOW 20 MIN: CPT

## 2019-07-19 RX ORDER — HYDROCHLOROTHIAZIDE 12.5 MG/1
12.5 CAPSULE ORAL
Qty: 90 | Refills: 3 | Status: DISCONTINUED | COMMUNITY
Start: 2019-05-15 | End: 2019-07-19

## 2019-07-19 RX ORDER — AMOXICILLIN AND CLAVULANATE POTASSIUM 875; 125 MG/1; MG/1
875-125 TABLET, COATED ORAL
Qty: 20 | Refills: 0 | Status: DISCONTINUED | COMMUNITY
Start: 2019-04-23

## 2019-07-19 RX ORDER — AMLODIPINE BESYLATE 10 MG/1
10 TABLET ORAL
Qty: 90 | Refills: 0 | Status: DISCONTINUED | COMMUNITY
Start: 2018-10-12

## 2019-07-19 NOTE — HISTORY OF PRESENT ILLNESS
[Disease: _____________________] : Disease: [unfilled] [T: ___] : T[unfilled] [N: ___] : N[unfilled] [AJCC Stage: ____] : AJCC Stage: [unfilled] [de-identified] : DENNYS SALMON is an 83 y.o. who we are following for an ER+, HER2 neg right sided stage IIA BC.\par Presented with an abnormality in her right breast on a routine mammogram.\par 6/20/18 - right breast lumpectomy and SLND - Path with a 2.1cm mod diff IDC, strongly ER and [place] pos, HER2 neg, SLN neg. Oncotype Recurrence score of 1.  Margins negative. \par Had adjuvant radiation. \par \par She has history of osteoporosis in the past and took bisphosphates several years ago. \par She had bone density 8/8/18: mild osteopenia left hip, normal in spine and right hip.  \par 9/2018 - Started Arimidex\par  [de-identified] : moderately differentiated infiltrating ductal cancer,  SBR 6/9, no LVI  [de-identified] : ER 98%,  WV 90%,   HER2  0/neg    Oncotype recurrence score 1  (4%)  [de-identified] : Feels well.  Has baseline pains in her hands but this is from arthritis and carpal tunnel.  No worse.  \par Has had worsening LE edema R>L - was started on water pills by cardiologist.  Wanted to know if Arimidex can be causing this. \par She denies any other changes in her family, medical, or social history since her last visit of 1/18/19

## 2019-07-19 NOTE — PHYSICAL EXAM
[Normal] : affect appropriate [Obese] : obese [de-identified] : looks very well for her age  [de-identified] : anicteric [de-identified] : no thrush or mucositis [de-identified] : no lymphadenopathy [de-identified] : S1S2 RRR, no obvious murmurs [de-identified] : LE edema 1 - 2+ R>L, some mild chronic venous stasis changes [de-identified] : no axillary lymphadenopathy [de-identified] : no rashes

## 2019-07-19 NOTE — REASON FOR VISIT
[Follow-Up Visit] : a follow-up [Spouse] : spouse [FreeTextEntry2] : Breast Cancer - Adjuvant Arimidex nearing 1 year

## 2019-07-19 NOTE — ASSESSMENT
[FreeTextEntry1] : The patient is an 83 y.o. with hx of an ER strongly positive T2N0 right BC, s/p right breast lumpectomy - very low Oncotype recurrence score of 1 - pathologic stage IIA, prognostic stage IA, s/p adjuvant radiation.\par On Arimidex since September 2018, tolerating well. Continue Arimidex for 7 years, or until 9/2025.\par Continue with routine surveillance:\par Mammo: 5/1/19 - BiRADS 2.  Goes yearly.  Also saw Dr. Verduzco 5/7/19.  \par GYN: 11/13/18\par Bone Density: 8/8/18 - Spine normal, Femur with T score -1.4 - mild osteopenia.  \par Patient has history of osteoporosis (treated) in the past.  \par Monitor. Continue Calcium with vit D. Bone density in 2 years ( Due August 2020).\par Reviewed that her LE edema is not from the Arimidex.  She will f/u with cardiology. \par Arimidex renewed. \par Return visit in 6 months. \par \par Dr. Chirag Mary\par Dr. Felix Waters\par Dr. Jessica Verduzco\par Dr. Susan Moreno

## 2019-07-19 NOTE — REVIEW OF SYSTEMS
[Patient Intake Form Reviewed] : Patient intake form was reviewed [Loss of Hearing] : loss of hearing [Joint Pain] : joint pain [Joint Stiffness] : joint stiffness [Negative] : Musculoskeletal [FreeTextEntry9] : Baseline arthritis pains

## 2019-09-11 ENCOUNTER — APPOINTMENT (OUTPATIENT)
Dept: CARDIOLOGY | Facility: CLINIC | Age: 84
End: 2019-09-11
Payer: MEDICARE

## 2019-09-11 PROCEDURE — 93880 EXTRACRANIAL BILAT STUDY: CPT

## 2019-09-11 PROCEDURE — 93306 TTE W/DOPPLER COMPLETE: CPT

## 2019-09-25 ENCOUNTER — APPOINTMENT (OUTPATIENT)
Dept: CARDIOLOGY | Facility: CLINIC | Age: 84
End: 2019-09-25
Payer: MEDICARE

## 2019-09-25 ENCOUNTER — NON-APPOINTMENT (OUTPATIENT)
Age: 84
End: 2019-09-25

## 2019-09-25 VITALS
SYSTOLIC BLOOD PRESSURE: 128 MMHG | HEART RATE: 50 BPM | HEIGHT: 60 IN | BODY MASS INDEX: 34.36 KG/M2 | DIASTOLIC BLOOD PRESSURE: 78 MMHG | WEIGHT: 175 LBS

## 2019-09-25 PROCEDURE — 99214 OFFICE O/P EST MOD 30 MIN: CPT

## 2019-09-25 PROCEDURE — 93000 ELECTROCARDIOGRAM COMPLETE: CPT

## 2019-09-25 RX ORDER — HYDROCHLOROTHIAZIDE 12.5 MG/1
12.5 TABLET ORAL
Refills: 0 | Status: DISCONTINUED | COMMUNITY
End: 2019-09-25

## 2019-09-25 NOTE — HISTORY OF PRESENT ILLNESS
[FreeTextEntry1] : She is tolerating her current medical regimen without difficulty and blood pressure remains under fairly good control\par \par ;

## 2019-09-25 NOTE — REASON FOR VISIT
[Follow-Up - Clinic] : a clinic follow-up of [FreeTextEntry1] : The patient is a 83-year-old white female originally from [facility] who presents back to the office today for general cardiac checkup and to discuss results of recent cardiac testing;\par \par She is accompanied with her  who is also a mutual patient\par \par Fortunately, she has had no recent symptoms of chest pain, shortness of breath or palpitations\par \par Recently her blood pressure medication was changed as she was getting edema from the amlodipine and it was discontinued and the losartan was increased to 100 mg daily;\par ;;

## 2019-09-25 NOTE — ASSESSMENT
[FreeTextEntry1] : EKG demonstrates normal sinus bradycardia at a rate of 50 with general low-voltage tracing and nonspecific T-wave flattening essentially unchanged\par \par Recent carotid duplex study demonstrated no evidence of atherosclerotic plaque bilaterally without any significant obstructive flow;\par \par Transthoracic echo demonstrates normal cardiac chamber sizes with normal systolic function of the left ventricle and mild MR and trace PI is seen;\par \par In summary the patient is an 83-year-old white female who has had a stable cardiac pattern on current medical regimen and cardiac testing;;\par \par Plan:\par \par No additional cardiac workup indicated at this time\par \par Continue current medical regimen;\par \par Followup for checkup within 6 months or p.r.n.;\par \par Continued timely checkups and laboratory blood tests with primary care;;

## 2019-09-25 NOTE — PHYSICAL EXAM
[Normal Conjunctiva] : the conjunctiva exhibited no abnormalities [Normal Oral Mucosa] : normal oral mucosa [Eyelids - No Xanthelasma] : the eyelids demonstrated no xanthelasmas [No Oral Cyanosis] : no oral cyanosis [No Oral Pallor] : no oral pallor [Normal Jugular Venous A Waves Present] : normal jugular venous A waves present [Normal Jugular Venous V Waves Present] : normal jugular venous V waves present [No Jugular Venous Mederos A Waves] : no jugular venous mederos A waves [Respiration, Rhythm And Depth] : normal respiratory rhythm and effort [Exaggerated Use Of Accessory Muscles For Inspiration] : no accessory muscle use [Auscultation Breath Sounds / Voice Sounds] : lungs were clear to auscultation bilaterally [Heart Rate And Rhythm] : heart rate and rhythm were normal [Heart Sounds] : normal S1 and S2 [Murmurs] : no murmurs present [Edema] : no peripheral edema present [Abdomen Tenderness] : non-tender [Abdomen Mass (___ Cm)] : no abdominal mass palpated [FreeTextEntry1] : Overweight soft nontender [Abnormal Walk] : normal gait [Gait - Sufficient For Exercise Testing] : the gait was sufficient for exercise testing [Nail Clubbing] : no clubbing of the fingernails [Petechial Hemorrhages (___cm)] : no petechial hemorrhages [Cyanosis, Localized] : no localized cyanosis [Skin Color & Pigmentation] : normal skin color and pigmentation [No Venous Stasis] : no venous stasis [] : no rash [No Skin Ulcers] : no skin ulcer [Skin Lesions] : no skin lesions [No Xanthoma] : no  xanthoma was observed [Oriented To Time, Place, And Person] : oriented to person, place, and time [Affect] : the affect was normal [Mood] : the mood was normal [No Anxiety] : not feeling anxious

## 2019-10-21 ENCOUNTER — APPOINTMENT (OUTPATIENT)
Dept: PULMONOLOGY | Facility: CLINIC | Age: 84
End: 2019-10-21
Payer: MEDICARE

## 2019-10-21 VITALS
SYSTOLIC BLOOD PRESSURE: 118 MMHG | DIASTOLIC BLOOD PRESSURE: 78 MMHG | WEIGHT: 177 LBS | BODY MASS INDEX: 34.57 KG/M2 | RESPIRATION RATE: 16 BRPM | OXYGEN SATURATION: 96 % | HEART RATE: 78 BPM

## 2019-10-21 VITALS — RESPIRATION RATE: 16 BRPM

## 2019-10-21 PROCEDURE — 99214 OFFICE O/P EST MOD 30 MIN: CPT

## 2019-10-21 NOTE — ASSESSMENT
[FreeTextEntry1] : Patient with obstructive sleep apnea with poor compliance. I reordered supplies for her. I told her to use Flonase every day for the next 6 weeks and if it makes an improvement in her ability to use CPAP, she should stay on the Flonase. CPAP supplies were renewed. Followup one year.

## 2019-10-21 NOTE — HISTORY OF PRESENT ILLNESS
[FreeTextEntry1] : Patient's compliance has suffered. She complains of nasal stuffiness and runny nose. She has been using Flonase only intermittently however. Compliance again is poor however she has no apneas on her median pressure of CPAP 6.

## 2019-10-21 NOTE — REVIEW OF SYSTEMS
[Fatigue] : fatigue [Hypertension] : ~T hypertension [Chest Discomfort] : chest discomfort [Heartburn] : heartburn [As Noted in HPI] : as noted in HPI [Negative] : Pulmonary Hypertension

## 2019-10-21 NOTE — PHYSICAL EXAM
[Normal Appearance] : normal appearance [General Appearance - Well Developed] : well developed [General Appearance - Well Nourished] : well nourished [Well Groomed] : well groomed [No Deformities] : no deformities [FreeTextEntry1] : obese [General Appearance - In No Acute Distress] : no acute distress [Normal Conjunctiva] : the conjunctiva exhibited no abnormalities [Eyelids - No Xanthelasma] : the eyelids demonstrated no xanthelasmas [Jugular Venous Distention Increased] : there was no jugular-venous distention [Neck Cervical Mass (___cm)] : no neck mass was observed [Neck Appearance] : the appearance of the neck was normal [Thyroid Nodule] : there were no palpable thyroid nodules [Heart Rate And Rhythm] : heart rate and rhythm were normal [Thyroid Diffuse Enlargement] : the thyroid was not enlarged [Heart Sounds] : normal S1 and S2 [Murmurs] : no murmurs present [Exaggerated Use Of Accessory Muscles For Inspiration] : no accessory muscle use [Respiration, Rhythm And Depth] : normal respiratory rhythm and effort [Auscultation Breath Sounds / Voice Sounds] : lungs were clear to auscultation bilaterally [Abdomen Soft] : soft [Abdomen Tenderness] : non-tender [Abdomen Mass (___ Cm)] : no abdominal mass palpated [Abnormal Walk] : normal gait [Gait - Sufficient For Exercise Testing] : the gait was sufficient for exercise testing [Sensation] : the sensory exam was normal to light touch and pinprick [No Focal Deficits] : no focal deficits [Deep Tendon Reflexes (DTR)] : deep tendon reflexes were 2+ and symmetric [Impaired Insight] : insight and judgment were intact [Oriented To Time, Place, And Person] : oriented to person, place, and time [Affect] : the affect was normal [No Oral Pallor] : no oral pallor [Normal Oral Mucosa] : normal oral mucosa [No Oral Cyanosis] : no oral cyanosis [Nail Clubbing] : no clubbing of the fingernails [Cyanosis, Localized] : no localized cyanosis [Petechial Hemorrhages (___cm)] : no petechial hemorrhages [] : no rash [Skin Color & Pigmentation] : normal skin color and pigmentation [Skin Turgor] : normal skin turgor

## 2019-11-07 ENCOUNTER — APPOINTMENT (OUTPATIENT)
Dept: BREAST CENTER | Facility: CLINIC | Age: 84
End: 2019-11-07
Payer: MEDICARE

## 2019-11-07 VITALS
HEIGHT: 60 IN | HEART RATE: 55 BPM | SYSTOLIC BLOOD PRESSURE: 173 MMHG | DIASTOLIC BLOOD PRESSURE: 70 MMHG | WEIGHT: 177 LBS | BODY MASS INDEX: 34.75 KG/M2

## 2019-11-07 PROCEDURE — 99213 OFFICE O/P EST LOW 20 MIN: CPT

## 2019-11-07 NOTE — PHYSICAL EXAM
[Normocephalic] : normocephalic [Supple] : supple [Sclera nonicteric] : sclera nonicteric [No Cervical Adenopathy] : no cervical adenopathy [No Supraclavicular Adenopathy] : no supraclavicular adenopathy [No Thyromegaly] : no thyromegaly [Clear to Auscultation Bilat] : clear to auscultation bilaterally [No Rubs] : no pericardial rub [Examined in the supine and seated position] : examined in the supine and seated position [Symmetrical] : symmetrical [Grade 2] : Ptosis Grade 2 [No dominant masses] : no dominant masses in right breast  [No dominant masses] : no dominant masses left breast [No Nipple Retraction] : no left nipple retraction [No Nipple Discharge] : no left nipple discharge [No Axillary Lymphadenopathy] : no left axillary lymphadenopathy [Soft] : abdomen soft [No Hepato-Splenomegaly] : no hepato-splenomegaly [No Edema] : no edema [No Rashes] : no rashes [No Ulceration] : no ulceration [de-identified] : Periareolar lumpectomy incision at 12-1:00  Postradiation edema. [de-identified] : Axillary incision healed

## 2019-11-07 NOTE — PAST MEDICAL HISTORY
[Postmenopausal] : The patient is postmenopausal [Menarche Age ____] : age at menarche was [unfilled] [Total Preg ___] : G[unfilled] [Live Births ___] : P[unfilled]  [Age At Live Birth ___] : Age at live birth: [unfilled] [History of Hormone Replacement Treatment] : has no history of hormone replacement treatment

## 2019-11-07 NOTE — HISTORY OF PRESENT ILLNESS
[FreeTextEntry1] : 84 year old female presents for a surveillance examination.  She underwent a right lumpectomy and sentinel node biopsy on  6/20/18 for stage 1A invasive ductal carcinoma.  Radiation therapy was completed in 09/18.

## 2019-11-07 NOTE — DATA REVIEWED
[FreeTextEntry1] : Mammogram    5/1/18      Findings:  2 cm mass with irregular borders central medial right breast.\par \par Right breast ultrasound:  1.6 x 1.8 x 1.5 cm irregular mass at 1:00 N2 c/w mammographic finding.\par \par Ultrasound guided core biopsy right breast (1:00)   PATHOLOGY: Moderately differentiated, invasive ductal carcinoma. No LVI.  ER receptor - 98%, [place] - 99%, HER2 - negative.\par \par Surgical Pathology:  6/20/18   Findings:  [place] nodes #1 and 2 - negative for malignancy. NOnsentinel node # 1 - negative.   Right lumpectomy;  2.1 cm moderately differentiated infiltrating ductal carcinoma.  NO LVI.  Margins of resection negative.   ER:  98%, [place] - 90%, HER2 - negative.\par \par Mammogram:  05/01/19   Findings:  No suspicious findings\par

## 2019-12-20 ENCOUNTER — MEDICATION RENEWAL (OUTPATIENT)
Age: 84
End: 2019-12-20

## 2020-01-21 ENCOUNTER — OUTPATIENT (OUTPATIENT)
Dept: OUTPATIENT SERVICES | Facility: HOSPITAL | Age: 85
LOS: 1 days | Discharge: HOME | End: 2020-01-21

## 2020-01-21 DIAGNOSIS — Z98.890 OTHER SPECIFIED POSTPROCEDURAL STATES: Chronic | ICD-10-CM

## 2020-01-21 DIAGNOSIS — C50.911 MALIGNANT NEOPLASM OF UNSPECIFIED SITE OF RIGHT FEMALE BREAST: ICD-10-CM

## 2020-01-21 DIAGNOSIS — Z90.49 ACQUIRED ABSENCE OF OTHER SPECIFIED PARTS OF DIGESTIVE TRACT: Chronic | ICD-10-CM

## 2020-01-21 DIAGNOSIS — Z98.49 CATARACT EXTRACTION STATUS, UNSPECIFIED EYE: Chronic | ICD-10-CM

## 2020-01-22 ENCOUNTER — RESULT REVIEW (OUTPATIENT)
Age: 85
End: 2020-01-22

## 2020-01-22 ENCOUNTER — APPOINTMENT (OUTPATIENT)
Dept: HEMATOLOGY ONCOLOGY | Facility: CLINIC | Age: 85
End: 2020-01-22
Payer: MEDICARE

## 2020-01-22 VITALS
RESPIRATION RATE: 16 BRPM | TEMPERATURE: 97.7 F | WEIGHT: 170 LBS | SYSTOLIC BLOOD PRESSURE: 160 MMHG | HEART RATE: 64 BPM | HEIGHT: 60 IN | DIASTOLIC BLOOD PRESSURE: 79 MMHG | BODY MASS INDEX: 33.38 KG/M2

## 2020-01-22 LAB
24R-OH-CALCIDIOL SERPL-MCNC: 34.8 NG/ML — SIGNIFICANT CHANGE UP (ref 30–80)
ALBUMIN SERPL ELPH-MCNC: 4.5 G/DL — SIGNIFICANT CHANGE UP (ref 3.3–5)
ALP SERPL-CCNC: 56 U/L — SIGNIFICANT CHANGE UP (ref 40–120)
ALT FLD-CCNC: 11 U/L — SIGNIFICANT CHANGE UP (ref 10–45)
ANION GAP SERPL CALC-SCNC: 12 MMOL/L — SIGNIFICANT CHANGE UP (ref 5–17)
AST SERPL-CCNC: 20 U/L — SIGNIFICANT CHANGE UP (ref 10–40)
BASOPHILS # BLD AUTO: 0.02 K/UL — SIGNIFICANT CHANGE UP (ref 0–0.2)
BASOPHILS NFR BLD AUTO: 0.3 % — SIGNIFICANT CHANGE UP (ref 0–2)
BILIRUB SERPL-MCNC: 0.2 MG/DL — SIGNIFICANT CHANGE UP (ref 0.2–1.2)
BUN SERPL-MCNC: 19 MG/DL — SIGNIFICANT CHANGE UP (ref 7–23)
CALCIUM SERPL-MCNC: 9.8 MG/DL — SIGNIFICANT CHANGE UP (ref 8.4–10.5)
CHLORIDE SERPL-SCNC: 104 MMOL/L — SIGNIFICANT CHANGE UP (ref 96–108)
CO2 SERPL-SCNC: 24 MMOL/L — SIGNIFICANT CHANGE UP (ref 22–31)
CREAT SERPL-MCNC: 0.8 MG/DL — SIGNIFICANT CHANGE UP (ref 0.5–1.3)
EOSINOPHIL # BLD AUTO: 0.05 K/UL — SIGNIFICANT CHANGE UP (ref 0–0.5)
EOSINOPHIL NFR BLD AUTO: 0.6 % — SIGNIFICANT CHANGE UP (ref 0–6)
GLUCOSE SERPL-MCNC: 128 MG/DL — HIGH (ref 70–99)
HCT VFR BLD CALC: 43 % — SIGNIFICANT CHANGE UP (ref 34.5–45)
HGB BLD-MCNC: 14.1 G/DL — SIGNIFICANT CHANGE UP (ref 11.5–15.5)
IMM GRANULOCYTES NFR BLD AUTO: 0.5 % — SIGNIFICANT CHANGE UP (ref 0–1.5)
LYMPHOCYTES # BLD AUTO: 0.92 K/UL — LOW (ref 1–3.3)
LYMPHOCYTES # BLD AUTO: 11.9 % — LOW (ref 13–44)
MCHC RBC-ENTMCNC: 29.8 PG — SIGNIFICANT CHANGE UP (ref 27–34)
MCHC RBC-ENTMCNC: 32.8 GM/DL — SIGNIFICANT CHANGE UP (ref 32–36)
MCV RBC AUTO: 90.9 FL — SIGNIFICANT CHANGE UP (ref 80–100)
MONOCYTES # BLD AUTO: 0.3 K/UL — SIGNIFICANT CHANGE UP (ref 0–0.9)
MONOCYTES NFR BLD AUTO: 3.9 % — SIGNIFICANT CHANGE UP (ref 2–14)
NEUTROPHILS # BLD AUTO: 6.38 K/UL — SIGNIFICANT CHANGE UP (ref 1.8–7.4)
NEUTROPHILS NFR BLD AUTO: 82.8 % — HIGH (ref 43–77)
NRBC # BLD: 0 /100 WBCS — SIGNIFICANT CHANGE UP (ref 0–0)
PLATELET # BLD AUTO: 145 K/UL — LOW (ref 150–400)
POTASSIUM SERPL-MCNC: 4 MMOL/L — SIGNIFICANT CHANGE UP (ref 3.5–5.3)
POTASSIUM SERPL-SCNC: 4 MMOL/L — SIGNIFICANT CHANGE UP (ref 3.5–5.3)
PROT SERPL-MCNC: 6.7 G/DL — SIGNIFICANT CHANGE UP (ref 6–8.3)
RBC # BLD: 4.73 M/UL — SIGNIFICANT CHANGE UP (ref 3.8–5.2)
RBC # FLD: 12.7 % — SIGNIFICANT CHANGE UP (ref 10.3–14.5)
SODIUM SERPL-SCNC: 140 MMOL/L — SIGNIFICANT CHANGE UP (ref 135–145)
WBC # BLD: 7.71 K/UL — SIGNIFICANT CHANGE UP (ref 3.8–10.5)
WBC # FLD AUTO: 7.71 K/UL — SIGNIFICANT CHANGE UP (ref 3.8–10.5)

## 2020-01-22 PROCEDURE — 99214 OFFICE O/P EST MOD 30 MIN: CPT

## 2020-01-22 NOTE — ASSESSMENT
[FreeTextEntry1] : 83 y/o woman with  early stage breast cancer diagnosed in 2018 : s/p right breast lumpectomy / sentinel node biopsy for T 2 ( 2.1 cm) IDC strongly ER/HI positive and HER 2 negative, very low Oncotype recurrence score of 1: pathologic stage II A, prognostic stage I A\par S/p adjuvant radiation.\par \par On Arimidex since September 2018  , tolerating well. Continue Arimidex for 7 years.\par \par Patient has history of osteoporosis ( treated ) in the past.  \par Bone density in 2018- mild osteopenia- overall improved.\par Monitor.\par Due for follow up bone density in 2020. She will continue Calcium with vit D. \par \par Breast surveillance imaging per Dr Verduzco - mammogram in May.\par \par Upper  back/ shoulder pain x 3-4 weeks  - improving already, started on steroids ( was improving even before steroids) . Evaluated by spine ortho. Likely  musculoskeletal. If persistent/ worse- will need imaging\par \par return visit in 6 months - after bone density/  sooner PRN.\par \par D/w patient and her .  \par \par

## 2020-01-22 NOTE — PHYSICAL EXAM
[Normal] : grossly intact [de-identified] : looks very well for her age  [de-identified] : fibrotic  periareolar lumpectomy scar right breast and axillary scar,  ; left breast normal exam , no masses [de-identified] : slight   right upper perispinal muscle spasm, ROM in shoulder is good

## 2020-01-22 NOTE — REVIEW OF SYSTEMS
[Patient Intake Form Reviewed] : Patient intake form was reviewed [Loss of Hearing] : loss of hearing [Joint Pain] : joint pain [Joint Stiffness] : joint stiffness [Negative] : Allergic/Immunologic [FreeTextEntry9] : mild arthritic pains , right shoulder/ upper mid back pain x one month - states much better

## 2020-01-22 NOTE — HISTORY OF PRESENT ILLNESS
[Disease: _____________________] : Disease: [unfilled] [T: ___] : T[unfilled] [N: ___] : N[unfilled] [AJCC Stage: ____] : AJCC Stage: [unfilled] [de-identified] : ER 98 %  CA 90 %  HER 2  0  neg    Oncotype recurrence score 1  ( 4 %)  [de-identified] : moderately differentiated infiltrating ductal cancer,  SBR 6/9, no LVI  [de-identified] : 81 y/o woman presented with an abnormality in her right breast on a routine mammogram.\par On 6/20/2018 she underwent right breast lumpectomy and sentinel node biopsy.Pathology showed 2.1 cm mod diff infiltrating ductal cancer, strongly ER and OK positive, HER 2 negative, sentinel node negative. Oncotype recurrence score of 1.  Margins negative. \par \par Had adjuvant radiation. \par \par She has history of osteoporosis in the past and took bisphosphates several years ago. \par She  had bone density 8/8/18 : mild osteopenia left hip, normal in spine and right hip.  \par \par started Arimidex in September 2018. \par  [de-identified] :  Tolerating Arimidex without any adverse effects.\par \par Has pain right shoulder/ upper back x 2-4 weeks. Seen by PCP and spine ortho. Now on medrol pack and gabapentin. pain is better.

## 2020-01-24 DIAGNOSIS — Z17.0 ESTROGEN RECEPTOR POSITIVE STATUS [ER+]: ICD-10-CM

## 2020-01-24 DIAGNOSIS — Z79.899 OTHER LONG TERM (CURRENT) DRUG THERAPY: ICD-10-CM

## 2020-01-24 DIAGNOSIS — M85.80 OTHER SPECIFIED DISORDERS OF BONE DENSITY AND STRUCTURE, UNSPECIFIED SITE: ICD-10-CM

## 2020-03-18 ENCOUNTER — APPOINTMENT (OUTPATIENT)
Dept: CARDIOLOGY | Facility: CLINIC | Age: 85
End: 2020-03-18

## 2020-06-25 ENCOUNTER — APPOINTMENT (OUTPATIENT)
Dept: BREAST CENTER | Facility: CLINIC | Age: 85
End: 2020-06-25
Payer: MEDICARE

## 2020-06-25 VITALS
SYSTOLIC BLOOD PRESSURE: 183 MMHG | HEART RATE: 53 BPM | BODY MASS INDEX: 33.18 KG/M2 | WEIGHT: 169 LBS | DIASTOLIC BLOOD PRESSURE: 80 MMHG | HEIGHT: 60 IN

## 2020-06-25 PROCEDURE — 99213 OFFICE O/P EST LOW 20 MIN: CPT

## 2020-06-25 NOTE — DATA REVIEWED
[FreeTextEntry1] : Mammogram    5/1/18      Findings:  2 cm mass with irregular borders central medial right breast.\par \par Right breast ultrasound:  1.6 x 1.8 x 1.5 cm irregular mass at 1:00 N2 c/w mammographic finding.\par \par Ultrasound guided core biopsy right breast (1:00)   PATHOLOGY: Moderately differentiated, invasive ductal carcinoma. No LVI.  ER receptor - 98%, KS - 99%, HER2 - negative.\par \par Surgical Pathology:  6/20/18   Findings:  [place] nodes #1 and 2 - negative for malignancy. NOnsentinel node # 1 - negative.   Right lumpectomy;  2.1 cm moderately differentiated infiltrating ductal carcinoma.  NO LVI.  Margins of resection negative.   ER:  98%, KS - 90%, HER2 - negative.\par \par Mammogram/sono:  0/5/20   Findings:  No suspicious findings\par

## 2020-06-25 NOTE — PHYSICAL EXAM
[Normocephalic] : normocephalic [Sclera nonicteric] : sclera nonicteric [Supple] : supple [No Supraclavicular Adenopathy] : no supraclavicular adenopathy [No Cervical Adenopathy] : no cervical adenopathy [No Thyromegaly] : no thyromegaly [No Rubs] : no pericardial rub [Clear to Auscultation Bilat] : clear to auscultation bilaterally [Examined in the supine and seated position] : examined in the supine and seated position [Symmetrical] : symmetrical [Grade 2] : Ptosis Grade 2 [No dominant masses] : no dominant masses in right breast  [No dominant masses] : no dominant masses left breast [No Nipple Retraction] : no left nipple retraction [No Nipple Discharge] : no left nipple discharge [No Axillary Lymphadenopathy] : no left axillary lymphadenopathy [Soft] : abdomen soft [No Hepato-Splenomegaly] : no hepato-splenomegaly [No Edema] : no edema [No Rashes] : no rashes [No Ulceration] : no ulceration [de-identified] : Axillary incision healed [de-identified] : Periareolar lumpectomy incision at 12-1:00  Postradiation edema.

## 2020-08-13 ENCOUNTER — NON-APPOINTMENT (OUTPATIENT)
Age: 85
End: 2020-08-13

## 2020-08-13 ENCOUNTER — APPOINTMENT (OUTPATIENT)
Dept: CARDIOLOGY | Facility: CLINIC | Age: 85
End: 2020-08-13
Payer: MEDICARE

## 2020-08-13 VITALS
RESPIRATION RATE: 16 BRPM | BODY MASS INDEX: 33.18 KG/M2 | HEIGHT: 60 IN | DIASTOLIC BLOOD PRESSURE: 84 MMHG | TEMPERATURE: 98.2 F | HEART RATE: 46 BPM | WEIGHT: 169 LBS | SYSTOLIC BLOOD PRESSURE: 180 MMHG

## 2020-08-13 PROCEDURE — 99215 OFFICE O/P EST HI 40 MIN: CPT

## 2020-08-13 PROCEDURE — 93000 ELECTROCARDIOGRAM COMPLETE: CPT

## 2020-08-13 RX ORDER — RANITIDINE HYDROCHLORIDE 150 MG/1
150 TABLET, FILM COATED ORAL
Refills: 0 | Status: DISCONTINUED | COMMUNITY
End: 2020-08-13

## 2020-08-13 NOTE — DISCUSSION/SUMMARY
[FreeTextEntry1] : 1).  Patient's blood pressure has remained suboptimally controlled over the last few office visit despite compliance with Losartan;  will add HCTZ 12.5 mg daily to her current medication regimen.\par \par 2).  She is to complete a  Transthoracic Echocardiogram in the near future to assess ascending aorta dilation, cardiac function and valvulopathy.\par \par 3).  Patient's palpitations are occurring occasionally and do not seem to be causing any other adverse symptoms;  discussed the possibility of a ONE week event monitor to assess any potentially significant arrhythmias but patient chooses to decline at this time.\par \par 4).  Follow up with PCP (Dr. Mary) regarding routine checkups and blood work including renal function, electrolytes and fasting lipid panel, have copy faxed to our office. \par \par 5).  Recommend patient report any untoward symptoms. \par \par 6).  Follow up with our office in 2 to 3 months or PRN.

## 2020-08-13 NOTE — PHYSICAL EXAM
[Normal Appearance] : normal appearance [General Appearance - In No Acute Distress] : no acute distress [Normal Conjunctiva] : the conjunctiva exhibited no abnormalities [Normal Oral Mucosa] : normal oral mucosa [Normal Jugular Venous V Waves Present] : normal jugular venous V waves present [Normal Jugular Venous A Waves Present] : normal jugular venous A waves present [No Jugular Venous Mederos A Waves] : no jugular venous mederos A waves [] : no respiratory distress [Respiration, Rhythm And Depth] : normal respiratory rhythm and effort [Auscultation Breath Sounds / Voice Sounds] : lungs were clear to auscultation bilaterally [Heart Rate And Rhythm] : heart rate and rhythm were normal [Edema] : no peripheral edema present [Heart Sounds] : normal S1 and S2 [Bowel Sounds] : normal bowel sounds [Cyanosis, Localized] : no localized cyanosis [Nail Clubbing] : no clubbing of the fingernails [Abnormal Walk] : normal gait [Skin Color & Pigmentation] : normal skin color and pigmentation [Oriented To Time, Place, And Person] : oriented to person, place, and time [Affect] : the affect was normal [Impaired Insight] : insight and judgment were intact [FreeTextEntry1] : Grade I/VI midsystolic murmur

## 2020-08-13 NOTE — ASSESSMENT
[FreeTextEntry1] : EKG 8/13/2020:  The EKG illustrates sinus bradycardia with rate variation, rate of 54 bpm, low voltage in precordial leads, Q waves in III and aVF, poor R wave progression in precordial leads.

## 2020-08-13 NOTE — REASON FOR VISIT
[FreeTextEntry1] : The patient is a 84-year-old white female [place] from [facility] who presents back to the office today for general cardiac checkup with known history of hypertension, lower extremity edema, heart murmur, ascending aorta dilation, valvular insufficiency and JORGE (on CPAP nightly).  \par \par Mrs. Rogers reports experiencing some occasional palpitations and a little "chest pressure" for the last few years.  She wouldn't necessarily say this has worsened lately, but she and her  thought they would mention it.  The chest pressure occurs randomly while at rest (nonexertional), transient, nonradiating, resolves spontaneously.\par \par She denies any associated symptoms such as diaphoresis, presyncope, syncope, SOB, MACIEL.

## 2020-08-13 NOTE — HISTORY OF PRESENT ILLNESS
[FreeTextEntry1] : Her blood pressures have been elevated for a few months now despite compliance with taking Losartan 50 mg BID daily.  Today's BP is (158/84).\par \par Apparently, she has tried taking Amlodipine in the past which she did not tolerate and was discontinued (LE edema).\par \par Most recent Transthoracic Echocardiogram (September 2019) demonstrated dilated ascending aorta (3.7 cm), normal LV systolic function with an LVEF of 55 to 60%, moderate MR.\par \par She completed a left heart catheterization back in (2016) at [facility] which demonstrated normal coronaries throughout.

## 2020-08-14 ENCOUNTER — RX RENEWAL (OUTPATIENT)
Age: 85
End: 2020-08-14

## 2020-08-24 ENCOUNTER — OUTPATIENT (OUTPATIENT)
Dept: OUTPATIENT SERVICES | Facility: HOSPITAL | Age: 85
LOS: 1 days | Discharge: HOME | End: 2020-08-24

## 2020-08-24 DIAGNOSIS — Z98.890 OTHER SPECIFIED POSTPROCEDURAL STATES: Chronic | ICD-10-CM

## 2020-08-24 DIAGNOSIS — Z90.49 ACQUIRED ABSENCE OF OTHER SPECIFIED PARTS OF DIGESTIVE TRACT: Chronic | ICD-10-CM

## 2020-08-24 DIAGNOSIS — Z98.49 CATARACT EXTRACTION STATUS, UNSPECIFIED EYE: Chronic | ICD-10-CM

## 2020-08-24 DIAGNOSIS — C50.911 MALIGNANT NEOPLASM OF UNSPECIFIED SITE OF RIGHT FEMALE BREAST: ICD-10-CM

## 2020-08-25 ENCOUNTER — APPOINTMENT (OUTPATIENT)
Dept: HEMATOLOGY ONCOLOGY | Facility: CLINIC | Age: 85
End: 2020-08-25
Payer: MEDICARE

## 2020-08-25 VITALS
BODY MASS INDEX: 32.91 KG/M2 | TEMPERATURE: 98.1 F | WEIGHT: 168.5 LBS | DIASTOLIC BLOOD PRESSURE: 75 MMHG | HEART RATE: 69 BPM | SYSTOLIC BLOOD PRESSURE: 163 MMHG

## 2020-08-25 PROCEDURE — 99214 OFFICE O/P EST MOD 30 MIN: CPT

## 2020-08-25 RX ORDER — CALCIUM CITRATE/VITAMIN D3 250MG-5MCG
TABLET ORAL
Refills: 0 | Status: ACTIVE | COMMUNITY

## 2020-08-25 RX ORDER — CETIRIZINE HCL 10 MG
TABLET ORAL
Refills: 0 | Status: DISCONTINUED | COMMUNITY
End: 2020-08-25

## 2020-08-25 RX ORDER — IBUPROFEN 200 MG/1
TABLET, COATED ORAL
Refills: 0 | Status: DISCONTINUED | COMMUNITY
End: 2020-08-25

## 2020-08-25 RX ORDER — PNV NO.95/FERROUS FUM/FOLIC AC 28MG-0.8MG
400 TABLET ORAL
Refills: 0 | Status: ACTIVE | COMMUNITY

## 2020-08-25 RX ORDER — BIOTIN 10 MG
TABLET ORAL DAILY
Refills: 0 | Status: ACTIVE | COMMUNITY

## 2020-08-25 RX ORDER — NAPROXEN SODIUM 220 MG
TABLET ORAL
Refills: 0 | Status: DISCONTINUED | COMMUNITY
End: 2020-08-25

## 2020-08-25 RX ORDER — MULTIVIT-MIN/FOLIC/VIT K/LYCOP 400-300MCG
25 MCG TABLET ORAL DAILY
Refills: 0 | Status: ACTIVE | COMMUNITY

## 2020-08-25 NOTE — ASSESSMENT
[FreeTextEntry1] : 83 y/o woman with  early stage breast cancer diagnosed in 2018 : s/p right breast lumpectomy / sentinel node biopsy for T 2 ( 2.1 cm) IDC strongly ER/DC positive and HER 2 negative, very low Oncotype recurrence score of 1: pathologic stage II A, prognostic stage I A\par S/p adjuvant radiation.\par \par On Arimidex since September 2018  , tolerating well. Continue Arimidex for 7 years.\par \par Patient has history of osteoporosis ( treated ) in the past.  \par Bone density in 2018- mild osteopenia- overall improved.\par \par Due for follow up bone density in 2020. She will continue Calcium with vit D. repat labs on next visit. \par \par Breast surveillance imaging per Dr Verduzco - mammogram/ sono  May  2021.\par Return visit in 6 months. sooner PRN.\par \par

## 2020-08-25 NOTE — HISTORY OF PRESENT ILLNESS
[Disease: _____________________] : Disease: [unfilled] [T: ___] : T[unfilled] [N: ___] : N[unfilled] [AJCC Stage: ____] : AJCC Stage: [unfilled] [de-identified] : moderately differentiated infiltrating ductal cancer,  SBR 6/9, no LVI  [de-identified] : 81 y/o woman presented with an abnormality in her right breast on a routine mammogram.\par On 6/20/2018 she underwent right breast lumpectomy and sentinel node biopsy.Pathology showed 2.1 cm mod diff infiltrating ductal cancer, strongly ER and [place] positive, HER 2 negative, sentinel node negative. Oncotype recurrence score of 1.  Margins negative. \par \par Had adjuvant radiation. \par \par She has history of osteoporosis in the past and took bisphosphates several years ago. \par She  had bone density 8/8/18 : mild osteopenia left hip, normal in spine and right hip.  \par \par started Arimidex in September 2018. \par  [de-identified] : ER 98 %  PA 90 %  HER 2  0  neg    Oncotype recurrence score 1  ( 4 %)  [de-identified] :  Tolerating Arimidex without any adverse effects.\par \par No new issues.\par \par \par Mammo/ sono may 2020 OK \par \par Did not go for bone density.

## 2020-08-25 NOTE — REASON FOR VISIT
[Follow-Up Visit] : a follow-up [FreeTextEntry2] : breast cancer on adjuvant Arimidex clears/sip water meds 07

## 2020-08-25 NOTE — PHYSICAL EXAM
[Normal] : affect appropriate [de-identified] : looks very well for her age  [de-identified] : fibrotic  periareolar lumpectomy scar right breast and axillary scar,  ; left breast normal exam , no masses [de-identified] : arthritic deformities hands

## 2020-08-25 NOTE — ASSESSMENT
[FreeTextEntry1] : 85 y/o woman with  early stage breast cancer diagnosed in 2018 : s/p right breast lumpectomy / sentinel node biopsy for T 2 ( 2.1 cm) IDC strongly ER/CA positive and HER 2 negative, very low Oncotype recurrence score of 1: pathologic stage II A, prognostic stage I A\par S/p adjuvant radiation.\par \par On Arimidex since September 2018  , tolerating well. Continue Arimidex for 7 years.\par \par Patient has history of osteoporosis ( treated ) in the past.  \par Bone density in 2018- mild osteopenia- overall improved.\par \par Due for follow up bone density in 2020. She will continue Calcium with vit D. repat labs on next visit. \par \par Breast surveillance imaging per Dr Verduzco - mammogram/ sono  May  2021.\par Return visit in 6 months. sooner PRN.\par \par

## 2020-08-25 NOTE — PHYSICAL EXAM
[Normal] : affect appropriate [de-identified] : looks very well for her age  [de-identified] : fibrotic  periareolar lumpectomy scar right breast and axillary scar,  ; left breast normal exam , no masses [de-identified] : arthritic deformities hands

## 2020-08-25 NOTE — HISTORY OF PRESENT ILLNESS
[Disease: _____________________] : Disease: [unfilled] [T: ___] : T[unfilled] [N: ___] : N[unfilled] [AJCC Stage: ____] : AJCC Stage: [unfilled] [de-identified] : moderately differentiated infiltrating ductal cancer,  SBR 6/9, no LVI  [de-identified] : 81 y/o woman presented with an abnormality in her right breast on a routine mammogram.\par On 6/20/2018 she underwent right breast lumpectomy and sentinel node biopsy.Pathology showed 2.1 cm mod diff infiltrating ductal cancer, strongly ER and CO positive, HER 2 negative, sentinel node negative. Oncotype recurrence score of 1.  Margins negative. \par \par Had adjuvant radiation. \par \par She has history of osteoporosis in the past and took bisphosphates several years ago. \par She  had bone density 8/8/18 : mild osteopenia left hip, normal in spine and right hip.  \par \par started Arimidex in September 2018. \par  [de-identified] : ER 98 %  MI 90 %  HER 2  0  neg    Oncotype recurrence score 1  ( 4 %)  [de-identified] :  Tolerating Arimidex without any adverse effects.\par \par No new issues.\par \par \par Mammo/ sono may 2020 OK \par \par Did not go for bone density.

## 2020-09-08 ENCOUNTER — APPOINTMENT (OUTPATIENT)
Dept: PULMONOLOGY | Facility: CLINIC | Age: 85
End: 2020-09-08
Payer: MEDICARE

## 2020-09-08 VITALS
OXYGEN SATURATION: 98 % | HEART RATE: 74 BPM | SYSTOLIC BLOOD PRESSURE: 130 MMHG | HEIGHT: 60 IN | DIASTOLIC BLOOD PRESSURE: 68 MMHG | WEIGHT: 169 LBS | BODY MASS INDEX: 33.18 KG/M2

## 2020-09-08 DIAGNOSIS — E66.9 OBESITY, UNSPECIFIED: ICD-10-CM

## 2020-09-08 PROCEDURE — 99214 OFFICE O/P EST MOD 30 MIN: CPT

## 2020-09-08 NOTE — PHYSICAL EXAM
[Normal Appearance] : normal appearance [General Appearance - Well Developed] : well developed [General Appearance - Well Nourished] : well nourished [Well Groomed] : well groomed [General Appearance - In No Acute Distress] : no acute distress [FreeTextEntry1] : obese [No Deformities] : no deformities [Normal Conjunctiva] : the conjunctiva exhibited no abnormalities [Eyelids - No Xanthelasma] : the eyelids demonstrated no xanthelasmas [Neck Appearance] : the appearance of the neck was normal [Jugular Venous Distention Increased] : there was no jugular-venous distention [Neck Cervical Mass (___cm)] : no neck mass was observed [Thyroid Nodule] : there were no palpable thyroid nodules [Thyroid Diffuse Enlargement] : the thyroid was not enlarged [Heart Rate And Rhythm] : heart rate and rhythm were normal [Heart Sounds] : normal S1 and S2 [Murmurs] : no murmurs present [Exaggerated Use Of Accessory Muscles For Inspiration] : no accessory muscle use [Respiration, Rhythm And Depth] : normal respiratory rhythm and effort [Abdomen Tenderness] : non-tender [Abdomen Soft] : soft [Auscultation Breath Sounds / Voice Sounds] : lungs were clear to auscultation bilaterally [Abdomen Mass (___ Cm)] : no abdominal mass palpated [Abnormal Walk] : normal gait [Sensation] : the sensory exam was normal to light touch and pinprick [Gait - Sufficient For Exercise Testing] : the gait was sufficient for exercise testing [Deep Tendon Reflexes (DTR)] : deep tendon reflexes were 2+ and symmetric [No Focal Deficits] : no focal deficits [Oriented To Time, Place, And Person] : oriented to person, place, and time [Affect] : the affect was normal [Impaired Insight] : insight and judgment were intact [Normal Oral Mucosa] : normal oral mucosa [No Oral Cyanosis] : no oral cyanosis [No Oral Pallor] : no oral pallor [Nail Clubbing] : no clubbing of the fingernails [Petechial Hemorrhages (___cm)] : no petechial hemorrhages [Cyanosis, Localized] : no localized cyanosis [Skin Turgor] : normal skin turgor [Skin Color & Pigmentation] : normal skin color and pigmentation [] : no rash

## 2020-09-08 NOTE — REVIEW OF SYSTEMS
[Fatigue] : fatigue [Hypertension] : ~T hypertension [Heartburn] : heartburn [Chest Discomfort] : chest discomfort [As Noted in HPI] : as noted in HPI [Negative] : Pulmonary Hypertension

## 2020-09-08 NOTE — ASSESSMENT
[FreeTextEntry1] : Obstructive sleep apnea on CPAP. Compliance is improving. Her cough is likely from reflux which is better when she wears CPAP. I explained that to her. Follow up will be in one year.

## 2020-09-08 NOTE — HISTORY OF PRESENT ILLNESS
[TextBox_4] : Patient's  states that patient is having improved compliance with CPAP but still not wearing it every night. She reports a dry cough on the days she does not wear CPAP.

## 2020-10-29 ENCOUNTER — APPOINTMENT (OUTPATIENT)
Dept: CARDIOLOGY | Facility: CLINIC | Age: 85
End: 2020-10-29
Payer: MEDICARE

## 2020-10-29 PROCEDURE — 93306 TTE W/DOPPLER COMPLETE: CPT

## 2020-11-11 ENCOUNTER — NON-APPOINTMENT (OUTPATIENT)
Age: 85
End: 2020-11-11

## 2020-11-11 ENCOUNTER — APPOINTMENT (OUTPATIENT)
Dept: CARDIOLOGY | Facility: CLINIC | Age: 85
End: 2020-11-11
Payer: MEDICARE

## 2020-11-11 VITALS
RESPIRATION RATE: 16 BRPM | BODY MASS INDEX: 33.38 KG/M2 | HEART RATE: 51 BPM | WEIGHT: 170 LBS | SYSTOLIC BLOOD PRESSURE: 157 MMHG | HEIGHT: 60 IN | TEMPERATURE: 98 F | DIASTOLIC BLOOD PRESSURE: 82 MMHG

## 2020-11-11 PROCEDURE — 93000 ELECTROCARDIOGRAM COMPLETE: CPT

## 2020-11-11 PROCEDURE — 99214 OFFICE O/P EST MOD 30 MIN: CPT

## 2020-11-11 NOTE — PHYSICAL EXAM
[Normal Conjunctiva] : the conjunctiva exhibited no abnormalities [Eyelids - No Xanthelasma] : the eyelids demonstrated no xanthelasmas [Normal Oral Mucosa] : normal oral mucosa [No Oral Pallor] : no oral pallor [No Oral Cyanosis] : no oral cyanosis [Normal Jugular Venous A Waves Present] : normal jugular venous A waves present [Normal Jugular Venous V Waves Present] : normal jugular venous V waves present [No Jugular Venous Mederos A Waves] : no jugular venous mederos A waves [Respiration, Rhythm And Depth] : normal respiratory rhythm and effort [Exaggerated Use Of Accessory Muscles For Inspiration] : no accessory muscle use [Auscultation Breath Sounds / Voice Sounds] : lungs were clear to auscultation bilaterally [Heart Rate And Rhythm] : heart rate and rhythm were normal [Heart Sounds] : normal S1 and S2 [Murmurs] : no murmurs present [Edema] : no peripheral edema present [Abdomen Tenderness] : non-tender [Abdomen Mass (___ Cm)] : no abdominal mass palpated [FreeTextEntry1] : Overweight soft nontender [Abnormal Walk] : normal gait [Gait - Sufficient For Exercise Testing] : the gait was sufficient for exercise testing [Nail Clubbing] : no clubbing of the fingernails [Cyanosis, Localized] : no localized cyanosis [Petechial Hemorrhages (___cm)] : no petechial hemorrhages [Skin Color & Pigmentation] : normal skin color and pigmentation [] : no rash [No Venous Stasis] : no venous stasis [Skin Lesions] : no skin lesions [No Skin Ulcers] : no skin ulcer [No Xanthoma] : no  xanthoma was observed [Oriented To Time, Place, And Person] : oriented to person, place, and time [Affect] : the affect was normal [Mood] : the mood was normal [No Anxiety] : not feeling anxious

## 2020-11-11 NOTE — HISTORY OF PRESENT ILLNESS
[FreeTextEntry1] : Recently, her blood pressure had been elevated and an attempt to augment her medication with adding Norvasc 5 mg daily the other day (Dr. Fields)\par \par However, the patient and her  realized that when she had tried Norvasc before with Dr. Mary, it seemed to have resulted in worsening peripheral edema --so they did not start that medication;\par \par Today, systolic blood pressure still appears elevated;\par \par ;

## 2020-11-11 NOTE — ASSESSMENT
[FreeTextEntry1] : EKG demonstrates sinus bradycardia at a rate of 51. There is RSR prime in V1 V2 with frequent PACs and nonspecific T-wave flattening; small Q waves in leads 3 and aVF of questionable significance;\par \par In summary the patient is an 85-year-old woman who has had uncontrolled systolic hypertension of recent, and abnormal EKG with sinus bradycardia and PACs but largely asymptomatic from a cardiac\par \par Plan:\par \par Have recommended empirically starting hydralazine 10 mg p.o. b.i.d. (can be taken with the losartan which is also listed as b.i.d.);\par \par Patient report any untoward lightheadedness dizziness\par \par Recent transthoracic echo from 10/29/20 showed: ( preserved left ventricular systolic function with normal ejection fraction 55-60%. Moderate MR mild TR and mild enlargement of the ascending aorta;)\par \par Followup to this office for blood pressure check within 2-3 months;;;;

## 2020-11-11 NOTE — REASON FOR VISIT
[Follow-Up - Clinic] : a clinic follow-up of [FreeTextEntry1] : The patient is an 85-year-old white female from [facility] who presents back to the office today accompanied with her  for general cardiac checkup. She has a history of chronic hypertension, heart murmur, mild ascending aortic enlargement, and peripheral edema as well as sleep apnea using CPAP nightly.;\par \par Overall, patient states she has been feeling generally well and has had no significant chest pain, shortness of breath, obligations with dizziness\par \par She does have some arthritic-like complaints in the right upper posterior neck radiating to the left shoulder;;

## 2020-12-29 ENCOUNTER — APPOINTMENT (OUTPATIENT)
Dept: CARDIOLOGY | Facility: CLINIC | Age: 85
End: 2020-12-29
Payer: MEDICARE

## 2020-12-29 ENCOUNTER — NON-APPOINTMENT (OUTPATIENT)
Age: 85
End: 2020-12-29

## 2020-12-29 VITALS
WEIGHT: 169 LBS | RESPIRATION RATE: 16 BRPM | HEART RATE: 54 BPM | SYSTOLIC BLOOD PRESSURE: 154 MMHG | BODY MASS INDEX: 33.18 KG/M2 | TEMPERATURE: 98.3 F | DIASTOLIC BLOOD PRESSURE: 80 MMHG | HEIGHT: 60 IN

## 2020-12-29 DIAGNOSIS — J31.0 CHRONIC RHINITIS: ICD-10-CM

## 2020-12-29 PROCEDURE — 99214 OFFICE O/P EST MOD 30 MIN: CPT

## 2020-12-29 PROCEDURE — 93000 ELECTROCARDIOGRAM COMPLETE: CPT

## 2020-12-29 NOTE — HISTORY OF PRESENT ILLNESS
[FreeTextEntry1] : She has been taking her medications regularly;\par \par Systolic blood pressure today appears elevated;\par \par Recent serum cholesterol readings show persistent elevated LDL cholesterol;

## 2020-12-29 NOTE — ASSESSMENT
[FreeTextEntry1] : EKG demonstrates sinus bradycardia at a rate of 54. Borderline first degree AV block. General low-voltage tracing with poor R-wave progression across the precordial leads and questionable small inferior Q waves;\par \par Last transthoracic echo from 10/20 demonstrates preserved LV systolic function with normal EF and range of 55-60%. Moderate MR mild TR and mild enlargement ascending aorta;\par \par \par in summary this 85-year-old woman has recent systolic hypertension and elevated serum cholesterol LDL levels but otherwise stable cardiac that;\par \par Plan:\par \par Recommend empirically increasing hydrochlorothiazide to 25 mg daily;continue other blood pressure medicines the same;\par \par Recommend increase simvastatin to 20 mg nightly;\par \par  Patient to report any untoward chest symptoms or change in symptoms between now and next visit within 3-4 months;\par \par Followup with primary care in the near future as well;

## 2020-12-29 NOTE — REASON FOR VISIT
[Follow-Up - Clinic] : a clinic follow-up of [FreeTextEntry1] : The patient is a 85-year-old white female who has a history for chronic hypertension, obstructive sleep apnea (using CPAP mask in the evening), and hyperlipidemia who presents back to the office for general cardiac checkup today;\par \par Patient reports that she gets occasional atypical chest discomfort and demonstrates home blood pressure readings with frequent systolic hypertension noted in the ranges of 150-170 mmHg;\par \par She states she has had no shortness of breath, palpitations but does get occasional dizziness;\par \par

## 2021-01-12 ENCOUNTER — APPOINTMENT (OUTPATIENT)
Dept: BREAST CENTER | Facility: CLINIC | Age: 86
End: 2021-01-12
Payer: MEDICARE

## 2021-01-12 VITALS
BODY MASS INDEX: 30.43 KG/M2 | HEIGHT: 60 IN | WEIGHT: 155 LBS | DIASTOLIC BLOOD PRESSURE: 76 MMHG | SYSTOLIC BLOOD PRESSURE: 171 MMHG | HEART RATE: 64 BPM

## 2021-01-12 DIAGNOSIS — Z85.3 PERSONAL HISTORY OF MALIGNANT NEOPLASM OF BREAST: ICD-10-CM

## 2021-01-12 PROCEDURE — 99213 OFFICE O/P EST LOW 20 MIN: CPT

## 2021-01-12 NOTE — HISTORY OF PRESENT ILLNESS
[FreeTextEntry1] : 85 year old female presents for a surveillance examination.  She underwent a right lumpectomy and sentinel node biopsy on  6/20/18 for stage 1A invasive ductal carcinoma.  Radiation therapy was completed in 09/18.

## 2021-01-12 NOTE — PHYSICAL EXAM
[Normocephalic] : normocephalic [Sclera nonicteric] : sclera nonicteric [Supple] : supple [No Supraclavicular Adenopathy] : no supraclavicular adenopathy [No Cervical Adenopathy] : no cervical adenopathy [No Thyromegaly] : no thyromegaly [Clear to Auscultation Bilat] : clear to auscultation bilaterally [No Rubs] : no pericardial rub [Examined in the supine and seated position] : examined in the supine and seated position [Symmetrical] : symmetrical [Grade 2] : Ptosis Grade 2 [No dominant masses] : no dominant masses in right breast  [No dominant masses] : no dominant masses left breast [No Nipple Retraction] : no left nipple retraction [No Nipple Discharge] : no left nipple discharge [No Axillary Lymphadenopathy] : no left axillary lymphadenopathy [Soft] : abdomen soft [No Hepato-Splenomegaly] : no hepato-splenomegaly [No Edema] : no edema [No Rashes] : no rashes [No Ulceration] : no ulceration [de-identified] : Periareolar lumpectomy incision at 12-1:00  Postradiation edema. [de-identified] : Axillary incision healed

## 2021-01-12 NOTE — DATA REVIEWED
[FreeTextEntry1] : Mammogram    5/1/18      Findings:  2 cm mass with irregular borders central medial right breast.\par \par Right breast ultrasound:  1.6 x 1.8 x 1.5 cm irregular mass at 1:00 N2 c/w mammographic finding.\par \par Ultrasound guided core biopsy right breast (1:00)   PATHOLOGY: Moderately differentiated, invasive ductal carcinoma. No LVI.  ER receptor - 98%, HI - 99%, HER2 - negative.\par \par Surgical Pathology:  6/20/18   Findings:  [place] nodes #1 and 2 - negative for malignancy. NOnsentinel node # 1 - negative.   Right lumpectomy;  2.1 cm moderately differentiated infiltrating ductal carcinoma.  NO LVI.  Margins of resection negative.   ER:  98%, HI - 90%, HER2 - negative.\par \par Mammogram/sono:  05/5/20   Findings:  No suspicious findings\par

## 2021-01-27 ENCOUNTER — OUTPATIENT (OUTPATIENT)
Dept: OUTPATIENT SERVICES | Facility: HOSPITAL | Age: 86
LOS: 1 days | Discharge: HOME | End: 2021-01-27

## 2021-01-27 DIAGNOSIS — Z98.890 OTHER SPECIFIED POSTPROCEDURAL STATES: Chronic | ICD-10-CM

## 2021-01-27 DIAGNOSIS — Z90.49 ACQUIRED ABSENCE OF OTHER SPECIFIED PARTS OF DIGESTIVE TRACT: Chronic | ICD-10-CM

## 2021-01-27 DIAGNOSIS — Z98.49 CATARACT EXTRACTION STATUS, UNSPECIFIED EYE: Chronic | ICD-10-CM

## 2021-01-27 DIAGNOSIS — C50.911 MALIGNANT NEOPLASM OF UNSPECIFIED SITE OF RIGHT FEMALE BREAST: ICD-10-CM

## 2021-02-22 ENCOUNTER — RESULT REVIEW (OUTPATIENT)
Age: 86
End: 2021-02-22

## 2021-02-22 ENCOUNTER — APPOINTMENT (OUTPATIENT)
Dept: HEMATOLOGY ONCOLOGY | Facility: CLINIC | Age: 86
End: 2021-02-22
Payer: MEDICARE

## 2021-02-22 VITALS
WEIGHT: 171 LBS | DIASTOLIC BLOOD PRESSURE: 85 MMHG | TEMPERATURE: 96.4 F | RESPIRATION RATE: 16 BRPM | HEART RATE: 52 BPM | BODY MASS INDEX: 33.57 KG/M2 | HEIGHT: 60 IN | SYSTOLIC BLOOD PRESSURE: 173 MMHG

## 2021-02-22 PROCEDURE — 99214 OFFICE O/P EST MOD 30 MIN: CPT

## 2021-02-22 RX ORDER — FAMOTIDINE 40 MG/1
40 TABLET, FILM COATED ORAL
Qty: 90 | Refills: 0 | Status: ACTIVE | COMMUNITY
Start: 2021-02-09

## 2021-02-22 RX ORDER — FLUTICASONE PROPIONATE 50 UG/1
50 SPRAY, METERED NASAL DAILY
Qty: 3 | Refills: 1 | Status: DISCONTINUED | COMMUNITY
Start: 2019-10-21 | End: 2021-02-22

## 2021-02-22 NOTE — HISTORY OF PRESENT ILLNESS
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 Oncotype recurrence score of 1.  Margins negative. \par \par Had adjuvant radiation. \par \par She has history of osteoporosis in the past and took bisphosphates several years ago. \par She  had bone density 8/8/18 : mild osteopenia left hip, normal in spine and right hip.  \par \par started Arimidex in September 2018. \par  [de-identified] : moderately differentiated infiltrating ductal cancer,  SBR 6/9, no LVI  [de-identified] : ER 98 %  SC 90 %  HER 2  0  neg    Oncotype recurrence score 1  ( 4 %)  [de-identified] :  Tolerating Arimidex without any adverse effects.\par \par No new issues.\par \par \par Mammo/ sono may 2020 OK \par \par Bone density August 2020- mild osteopenia.

## 2021-02-22 NOTE — PHYSICAL EXAM
[Normal] : affect appropriate [de-identified] : looks very well for her age  [de-identified] : fibrotic  periareolar lumpectomy scar right breast and axillary scar,  ; left breast normal exam , no masses [de-identified] : arthritic deformities hands

## 2021-02-22 NOTE — REVIEW OF SYSTEMS
[Patient Intake Form Reviewed] : Patient intake form was reviewed [Loss of Hearing] : loss of hearing [Joint Pain] : joint pain [Joint Stiffness] : joint stiffness [Negative] : Allergic/Immunologic [FreeTextEntry9] : mild arthritic pains  not new  [de-identified] : memory loss

## 2021-05-12 ENCOUNTER — NON-APPOINTMENT (OUTPATIENT)
Age: 86
End: 2021-05-12

## 2021-05-12 ENCOUNTER — APPOINTMENT (OUTPATIENT)
Dept: CARDIOLOGY | Facility: CLINIC | Age: 86
End: 2021-05-12
Payer: MEDICARE

## 2021-05-12 VITALS
SYSTOLIC BLOOD PRESSURE: 120 MMHG | BODY MASS INDEX: 33.38 KG/M2 | WEIGHT: 170 LBS | HEART RATE: 70 BPM | HEIGHT: 60 IN | RESPIRATION RATE: 16 BRPM | TEMPERATURE: 97.7 F | DIASTOLIC BLOOD PRESSURE: 80 MMHG

## 2021-05-12 DIAGNOSIS — R09.89 OTHER SPECIFIED SYMPTOMS AND SIGNS INVOLVING THE CIRCULATORY AND RESPIRATORY SYSTEMS: ICD-10-CM

## 2021-05-12 PROCEDURE — 99214 OFFICE O/P EST MOD 30 MIN: CPT

## 2021-05-12 PROCEDURE — 93000 ELECTROCARDIOGRAM COMPLETE: CPT

## 2021-05-12 RX ORDER — HYDROCHLOROTHIAZIDE 25 MG/1
25 TABLET ORAL DAILY
Qty: 90 | Refills: 2 | Status: DISCONTINUED | COMMUNITY
Start: 2020-08-13 | End: 2021-05-12

## 2021-05-12 NOTE — REASON FOR VISIT
[Structural Heart and Valve Disease] : structural heart and valve disease [Hypertension] : hypertension [Other: ____] : [unfilled] [FreeTextEntry3] : Chirag Mary [FreeTextEntry1] : The patient is an 85-year-old white female who was bitten monitor in the past for history of hypertension with recent adjustment of her antihypertensive regimen, none OSS 80 using CPAP nightly, and hyperlipidemia.;\par \par She presents back to the office for general cardiac checkup today\par \par Seems that within the last few weeks the patient had fallen at home on the front stairs to her house sustaining injuries to her head, jaw, neck and right arm;\par \par ;

## 2021-05-12 NOTE — HISTORY OF PRESENT ILLNESS
[FreeTextEntry1] : It may have been brief loss of consciousness as she was dazed right after the fall and seen by her  and another bystander who helped her;\par \par Today she presents to the office along with her  for check up and with her son;\par \par Overall patient seems to be convalescing from this fall but still has a slight aches and pains from the regions where she was injured;\par \par She was seen by her primary physician as well and sent for some radiographic studies;\par \par She's had some atypical right parasternal chest discomfort but this does not appear exertional related;\par \par Echocardiogram from 10/29/20 shows preserved cardiac  chamber sizes with normal systolic function of the left ventricle and normal ejection fraction greater than 60%;  Moderate MR, mild TR; mild dilatation of ascending aorta;

## 2021-05-12 NOTE — ASSESSMENT
[FreeTextEntry1] : EKG demonstrating normal sinus rhythm at a rate of 70;  RSR prime V1 and V2. Pain he Q waves in leads 2, 3, aVF-unchanged;\par General low-voltage tracing;\par \par \par In summary this 85-year-old woman has a history of recent for which muscular aches and pains and slight headache undergoing evaluation with primary care and radiographic studies;\par \par Seems hemodynamically and cardiac stable on recent enhanced blood pressure medication;\par \par No additional cardiac workup indicated at this time\par \par Okay to continue current medical regimen;\par \par Followup to office within 3-4 months or p.r.n.;;

## 2021-05-12 NOTE — REVIEW OF SYSTEMS
[Headache] : headache [Chest Discomfort] : chest discomfort [Negative] : Heme/Lymph [FreeTextEntry4] : TMJ on the right side

## 2021-05-12 NOTE — PHYSICAL EXAM
[Well Developed] : well developed [Well Nourished] : well nourished [No Acute Distress] : no acute distress [Normal Conjunctiva] : normal conjunctiva [Normal Venous Pressure] : normal venous pressure [No Carotid Bruit] : no carotid bruit [Normal S1, S2] : normal S1, S2 [No Gallop] : no gallop [Clear Lung Fields] : clear lung fields [Good Air Entry] : good air entry [No Respiratory Distress] : no respiratory distress  [Soft] : abdomen soft [Non Tender] : non-tender [No Masses/organomegaly] : no masses/organomegaly [Normal Bowel Sounds] : normal bowel sounds [Normal Gait] : normal gait [No Edema] : no edema [No Cyanosis] : no cyanosis [No Clubbing] : no clubbing [No Varicosities] : no varicosities [No Rash] : no rash [Moves all extremities] : moves all extremities [No Focal Deficits] : no focal deficits [Normal Speech] : normal speech [Alert and Oriented] : alert and oriented [Normal memory] : normal memory [de-identified] : regular rhythm with a grade 1/6 systolic murmur; mild point tenderness to palpation in the right parasternal chest region; [de-identified] : mild ecchymosis in the right upper extremity and elbow

## 2021-06-28 ENCOUNTER — APPOINTMENT (OUTPATIENT)
Dept: BREAST CENTER | Facility: CLINIC | Age: 86
End: 2021-06-28
Payer: MEDICARE

## 2021-06-28 VITALS
HEART RATE: 54 BPM | WEIGHT: 168 LBS | DIASTOLIC BLOOD PRESSURE: 80 MMHG | BODY MASS INDEX: 32.98 KG/M2 | HEIGHT: 60 IN | SYSTOLIC BLOOD PRESSURE: 179 MMHG

## 2021-06-28 PROCEDURE — 99213 OFFICE O/P EST LOW 20 MIN: CPT

## 2021-06-28 NOTE — DATA REVIEWED
[FreeTextEntry1] : Mammogram    5/1/18      Findings:  2 cm mass with irregular borders central medial right breast.\par \par Right breast ultrasound:  1.6 x 1.8 x 1.5 cm irregular mass at 1:00 N2 c/w mammographic finding.\par \par Ultrasound guided core biopsy right breast (1:00)   PATHOLOGY: Moderately differentiated, invasive ductal carcinoma. No LVI.  ER receptor - 98%, IL - 99%, HER2 - negative.\par \par Surgical Pathology:  6/20/18   Findings:  [place] nodes #1 and 2 - negative for malignancy. NOnsentinel node # 1 - negative.   Right lumpectomy;  2.1 cm moderately differentiated infiltrating ductal carcinoma.  NO LVI.  Margins of resection negative.   ER:  98%, IL - 90%, HER2 - negative.  \par \par Mammogram/sono:  05/24/21     Findings:  No suspicious findings\par

## 2021-06-28 NOTE — HISTORY OF PRESENT ILLNESS
[FreeTextEntry1] : 85 year old female presents for a surveillance examination.  She underwent a right lumpectomy and sentinel node biopsy on  6/20/18 for stage 1A invasive ductal carcinoma.  Radiation therapy was completed in 09/18.\par \par Pathology: moderately differentiated infiltrating ductal cancer, SBR 6/9, no LVI \par TNM stage: T2 ( 2.1 cm), N0 ( 0/3) \par AJCC Stage: pathologic II A, prognostic I A  \par Tumor Markers: ER 98 % CO 90 % HER 2 0 neg Oncotype recurrence score 1 ( 4 %)

## 2021-06-28 NOTE — PHYSICAL EXAM
[Normocephalic] : normocephalic [Sclera nonicteric] : sclera nonicteric [Supple] : supple [No Supraclavicular Adenopathy] : no supraclavicular adenopathy [No Cervical Adenopathy] : no cervical adenopathy [No Thyromegaly] : no thyromegaly [Clear to Auscultation Bilat] : clear to auscultation bilaterally [No Rubs] : no pericardial rub [Examined in the supine and seated position] : examined in the supine and seated position [Symmetrical] : symmetrical [Grade 2] : Ptosis Grade 2 [No dominant masses] : no dominant masses in right breast  [No dominant masses] : no dominant masses left breast [No Nipple Retraction] : no left nipple retraction [No Nipple Discharge] : no left nipple discharge [No Axillary Lymphadenopathy] : no left axillary lymphadenopathy [Soft] : abdomen soft [No Hepato-Splenomegaly] : no hepato-splenomegaly [No Edema] : no edema [No Rashes] : no rashes [No Ulceration] : no ulceration [de-identified] : Periareolar lumpectomy incision at 12-1:00  Slight volume loss. [de-identified] : Axillary incision healed

## 2021-08-13 ENCOUNTER — APPOINTMENT (OUTPATIENT)
Dept: VASCULAR SURGERY | Facility: CLINIC | Age: 86
End: 2021-08-13
Payer: MEDICARE

## 2021-08-13 DIAGNOSIS — M79.604 PAIN IN RIGHT LEG: ICD-10-CM

## 2021-08-13 DIAGNOSIS — M79.605 PAIN IN RIGHT LEG: ICD-10-CM

## 2021-08-13 PROCEDURE — 99203 OFFICE O/P NEW LOW 30 MIN: CPT

## 2021-08-13 PROCEDURE — 93970 EXTREMITY STUDY: CPT

## 2021-08-13 NOTE — PHYSICAL EXAM
[2+] : left 2+ [Alert] : alert [Oriented to Person] : oriented to person [Oriented to Place] : oriented to place [Oriented to Time] : oriented to time [Calm] : calm [de-identified] : WD, WN, NAD. Awake, alert, interactive. Ambulates without difficulty [de-identified] : ALEXIA, PERNERYL [de-identified] : supple [de-identified] : no cyanosis or deformity. full ROM, MS 5/5\par  [de-identified] : TERRANCE

## 2021-08-13 NOTE — DATA REVIEWED
[FreeTextEntry1] : U/S Venous Duplex BLE 8/13/21 demonstrates no DVT, SVT or VI. There was a popliteal cyst in left posterior knee.

## 2021-08-13 NOTE — ASSESSMENT
[FreeTextEntry1] : 86 y/o female with pain to her legs. U/S was negative for DVT, SVT and VI, but did show a popliteal cyst. Pain seems to be coming from her low back. I will prescribe a short treatment of Diclofenac and Baclofen to see if symptoms improve. She should walk daily for exercise.\par \par \par Plan:\par Start Baclofen 10 mg TID\par Start Diclofenac 50 mg BID.\par Walk daily for exercise.\par RTC 6-8 weeks.\par \par \par Patient seen Dr Cam Emery. I personally discussed this patient with Swetha Qureshi, Nurse Practitioner who was a scribe for the visit. I agree with the assessment and plan as written by the Nurse Practitioner and agree with the findings and plan as documented in the Nurse Practitioner's note, unless noted below.

## 2021-08-13 NOTE — HISTORY OF PRESENT ILLNESS
[FreeTextEntry1] : 84 y/o female here for evaluation of varicose veins, pain and swelling to legs from thighs through feet. She states she is active, ambulates frequently and elevates legs at rest. In the past compression stockings 20-30 mmHg were ineffective and stopped after several months of treatment. She reports pain to her legs described as burning, tingling and cramping. Symptoms began about a year ago.  She is active, ambulates as frequently as possible and elevates legs at rest above the level of the heart. She walks daily for exercise. She maintains a balanced diet and adequate hydration. No pain reported. No fever or chills. She is a non-smoker.\par \par

## 2021-08-24 ENCOUNTER — OUTPATIENT (OUTPATIENT)
Dept: OUTPATIENT SERVICES | Facility: HOSPITAL | Age: 86
LOS: 1 days | Discharge: HOME | End: 2021-08-24

## 2021-08-24 DIAGNOSIS — Z98.890 OTHER SPECIFIED POSTPROCEDURAL STATES: Chronic | ICD-10-CM

## 2021-08-24 DIAGNOSIS — Z90.49 ACQUIRED ABSENCE OF OTHER SPECIFIED PARTS OF DIGESTIVE TRACT: Chronic | ICD-10-CM

## 2021-08-24 DIAGNOSIS — Z98.49 CATARACT EXTRACTION STATUS, UNSPECIFIED EYE: Chronic | ICD-10-CM

## 2021-08-24 DIAGNOSIS — C50.911 MALIGNANT NEOPLASM OF UNSPECIFIED SITE OF RIGHT FEMALE BREAST: ICD-10-CM

## 2021-08-25 ENCOUNTER — APPOINTMENT (OUTPATIENT)
Dept: HEMATOLOGY ONCOLOGY | Facility: CLINIC | Age: 86
End: 2021-08-25
Payer: MEDICARE

## 2021-08-25 VITALS
BODY MASS INDEX: 31.61 KG/M2 | TEMPERATURE: 98.6 F | DIASTOLIC BLOOD PRESSURE: 77 MMHG | SYSTOLIC BLOOD PRESSURE: 165 MMHG | WEIGHT: 161 LBS | HEART RATE: 58 BPM | HEIGHT: 60 IN

## 2021-08-25 PROCEDURE — 99214 OFFICE O/P EST MOD 30 MIN: CPT

## 2021-08-25 NOTE — PHYSICAL EXAM
[Normal] : affect appropriate [de-identified] : looks very well for her age  [de-identified] : fibrotic  periareolar lumpectomy scar right breast and axillary scar,  ; left breast normal exam , no masses [de-identified] : arthritic deformities hands

## 2021-08-25 NOTE — REVIEW OF SYSTEMS
[Patient Intake Form Reviewed] : Patient intake form was reviewed [Loss of Hearing] : loss of hearing [Joint Pain] : joint pain [Joint Stiffness] : joint stiffness [Negative] : Allergic/Immunologic [FreeTextEntry9] : mild arthritic pains  not new  [de-identified] : memory loss

## 2021-08-25 NOTE — ASSESSMENT
[FreeTextEntry1] : 86 y/o woman with  early stage breast cancer diagnosed in 2018 : s/p right breast lumpectomy / sentinel node biopsy for T 2 ( 2.1 cm) IDC strongly ER/ME positive and HER 2 negative, very low Oncotype recurrence score of 1: pathologic stage II A, prognostic stage I A\par S/p adjuvant radiation.\par \par On Arimidex since September 2018  , tolerating well. Continue Arimidex for 5- 7 years.\par \par Patient has history of osteoporosis ( treated ) in the past.  \par Bone density in 2020 -  mild osteopenia.\par Follow up bone density in 2022\par Continue Calcium with vit D.  \par \par Breast surveillance imaging  annual  mammogram/ sono  May  2022.\par \par HTN- not new- on meds. Follows with cardiology to adjust meds. \par \par Return visit in 6 months. sooner PRN.  D/w patient and her daughter. \par \par

## 2021-08-25 NOTE — HISTORY OF PRESENT ILLNESS
[Disease: _____________________] : Disease: [unfilled] [T: ___] : T[unfilled] [N: ___] : N[unfilled] [AJCC Stage: ____] : AJCC Stage: [unfilled] [de-identified] : 81 y/o woman presented with an abnormality in her right breast on a routine mammogram.\par On 6/20/2018 she underwent right breast lumpectomy and sentinel node biopsy.Pathology showed 2.1 cm mod diff infiltrating ductal cancer, strongly ER and GA positive, HER 2 negative, sentinel node negative. Oncotype recurrence score of 1.  Margins negative. \par \par Had adjuvant radiation. \par \par She has history of osteoporosis in the past and took bisphosphates several years ago. \par She  had bone density 8/8/18 : mild osteopenia left hip, normal in spine and right hip.  \par \par started Arimidex in September 2018. \par  [de-identified] : moderately differentiated infiltrating ductal cancer,  SBR 6/9, no LVI  [de-identified] : ER 98 %  [place] 90 %  HER 2  0  neg    Oncotype recurrence score 1  ( 4 %)  [de-identified] :  Tolerating Arimidex without any adverse effects.\par \par No new issues. Chronic arthritic pains - had for years no change\par \par \par Mammo/ sono may 2021 OK \par \par Bone density August 2020- mild osteopenia.

## 2021-08-26 DIAGNOSIS — Z08 ENCOUNTER FOR FOLLOW-UP EXAMINATION AFTER COMPLETED TREATMENT FOR MALIGNANT NEOPLASM: ICD-10-CM

## 2021-08-26 DIAGNOSIS — C50.111 MALIGNANT NEOPLASM OF CENTRAL PORTION OF RIGHT FEMALE BREAST: ICD-10-CM

## 2021-08-26 DIAGNOSIS — Z51.81 ENCOUNTER FOR THERAPEUTIC DRUG LEVEL MONITORING: ICD-10-CM

## 2021-09-22 ENCOUNTER — RX RENEWAL (OUTPATIENT)
Age: 86
End: 2021-09-22

## 2021-09-22 ENCOUNTER — APPOINTMENT (OUTPATIENT)
Dept: PULMONOLOGY | Facility: CLINIC | Age: 86
End: 2021-09-22
Payer: MEDICARE

## 2021-09-22 VITALS
BODY MASS INDEX: 32.81 KG/M2 | DIASTOLIC BLOOD PRESSURE: 80 MMHG | HEART RATE: 56 BPM | SYSTOLIC BLOOD PRESSURE: 144 MMHG | OXYGEN SATURATION: 95 % | WEIGHT: 168 LBS | RESPIRATION RATE: 16 BRPM

## 2021-09-22 PROCEDURE — 99214 OFFICE O/P EST MOD 30 MIN: CPT

## 2021-09-22 NOTE — PHYSICAL EXAM
[General Appearance - Well Developed] : well developed [Normal Appearance] : normal appearance [General Appearance - Well Nourished] : well nourished [Well Groomed] : well groomed [No Deformities] : no deformities [FreeTextEntry1] : obese [General Appearance - In No Acute Distress] : no acute distress [Normal Conjunctiva] : the conjunctiva exhibited no abnormalities [Eyelids - No Xanthelasma] : the eyelids demonstrated no xanthelasmas [Neck Appearance] : the appearance of the neck was normal [Jugular Venous Distention Increased] : there was no jugular-venous distention [Neck Cervical Mass (___cm)] : no neck mass was observed [Thyroid Diffuse Enlargement] : the thyroid was not enlarged [Thyroid Nodule] : there were no palpable thyroid nodules [Heart Rate And Rhythm] : heart rate and rhythm were normal [Murmurs] : no murmurs present [Heart Sounds] : normal S1 and S2 [Respiration, Rhythm And Depth] : normal respiratory rhythm and effort [Auscultation Breath Sounds / Voice Sounds] : lungs were clear to auscultation bilaterally [Exaggerated Use Of Accessory Muscles For Inspiration] : no accessory muscle use [Abdomen Tenderness] : non-tender [Abdomen Soft] : soft [Abdomen Mass (___ Cm)] : no abdominal mass palpated [Abnormal Walk] : normal gait [Gait - Sufficient For Exercise Testing] : the gait was sufficient for exercise testing [Deep Tendon Reflexes (DTR)] : deep tendon reflexes were 2+ and symmetric [Sensation] : the sensory exam was normal to light touch and pinprick [No Focal Deficits] : no focal deficits [Oriented To Time, Place, And Person] : oriented to person, place, and time [Impaired Insight] : insight and judgment were intact [Affect] : the affect was normal [Normal Oral Mucosa] : normal oral mucosa [No Oral Pallor] : no oral pallor [No Oral Cyanosis] : no oral cyanosis [Cyanosis, Localized] : no localized cyanosis [Nail Clubbing] : no clubbing of the fingernails [Petechial Hemorrhages (___cm)] : no petechial hemorrhages [Skin Color & Pigmentation] : normal skin color and pigmentation [Skin Turgor] : normal skin turgor [] : no rash

## 2021-09-22 NOTE — ASSESSMENT
[FreeTextEntry1] : Obstructive sleep apnea with excellent compliance and benefit from CPAP. Her machine is at the end of its useful life.\par New auto Pap machine 4-12 cm water has been ordered for her. Followup one year. Supplies renewed as well.

## 2021-09-22 NOTE — HISTORY OF PRESENT ILLNESS
[TextBox_4] : Patient has been compliant with CPAP and benefiting from its use with and approved alertness. She needs new supplies. Her machine is at the end of its useful life.

## 2021-09-23 ENCOUNTER — RX RENEWAL (OUTPATIENT)
Age: 86
End: 2021-09-23

## 2021-09-28 ENCOUNTER — RX RENEWAL (OUTPATIENT)
Age: 86
End: 2021-09-28

## 2021-10-05 ENCOUNTER — NON-APPOINTMENT (OUTPATIENT)
Age: 86
End: 2021-10-05

## 2021-10-05 ENCOUNTER — APPOINTMENT (OUTPATIENT)
Dept: CARDIOLOGY | Facility: CLINIC | Age: 86
End: 2021-10-05
Payer: MEDICARE

## 2021-10-05 VITALS
SYSTOLIC BLOOD PRESSURE: 140 MMHG | HEART RATE: 58 BPM | WEIGHT: 170 LBS | HEIGHT: 60 IN | RESPIRATION RATE: 16 BRPM | DIASTOLIC BLOOD PRESSURE: 78 MMHG | BODY MASS INDEX: 33.38 KG/M2

## 2021-10-05 PROCEDURE — 93000 ELECTROCARDIOGRAM COMPLETE: CPT

## 2021-10-05 PROCEDURE — 99214 OFFICE O/P EST MOD 30 MIN: CPT

## 2021-10-05 NOTE — HISTORY OF PRESENT ILLNESS
[FreeTextEntry1] : There's been no palpitations, and dizziness or syncope;\par \par Unfortunately patient did fall at home last May 2021- and sustained injuries to her head, and sure and neck all on the right side going into her right shoulder;\par \par In 2016, the patient underwent cardiac catheterization at [facility] which demonstrated normal coronary arteries;\par \par

## 2021-10-05 NOTE — REASON FOR VISIT
[Arrhythmia/ECG Abnorrmalities] : arrhythmia/ECG abnormalities [Structural Heart and Valve Disease] : structural heart and valve disease [Hyperlipidemia] : hyperlipidemia [Hypertension] : hypertension [Spouse] : spouse [FreeTextEntry3] : RAFIA BARRIOS [FreeTextEntry1] : The patient is a 86-year-old white female who presents back to the office today for general cardiac checkup.; She has a known history for hypertension and abnormal EKG, with hyperlipidemia;\par \par Patient is also currently using CPAP mask for history of obstructive sleep apnea.;\par \par She has some somatic complaints such as some nondescript anterior chest discomfort usually in the right and left parasternal areas;\par But this is not particularly exertional related and has been about the same as previous, nd believed to be part of her arthritides;

## 2021-10-05 NOTE — REVIEW OF SYSTEMS
[Chest Discomfort] : chest discomfort [Joint Pain] : joint pain [Joint Stiffness] : joint stiffness [Shoulder Problem] : shoulder problems [Shoulder Pain] : shoulder pain [Negative] : Heme/Lymph [FreeTextEntry2] : right neck discomfort

## 2021-10-05 NOTE — PHYSICAL EXAM
[Well Developed] : well developed [Well Nourished] : well nourished [No Acute Distress] : no acute distress [Normal Conjunctiva] : normal conjunctiva [Normal Venous Pressure] : normal venous pressure [No Carotid Bruit] : no carotid bruit [Normal S1, S2] : normal S1, S2 [No Rub] : no rub [Clear Lung Fields] : clear lung fields [Good Air Entry] : good air entry [No Respiratory Distress] : no respiratory distress  [Soft] : abdomen soft [Non Tender] : non-tender [No Masses/organomegaly] : no masses/organomegaly [Normal Bowel Sounds] : normal bowel sounds [Normal Gait] : normal gait [No Edema] : no edema [No Cyanosis] : no cyanosis [No Clubbing] : no clubbing [No Varicosities] : no varicosities [No Rash] : no rash [No Skin Lesions] : no skin lesions [Moves all extremities] : moves all extremities [No Focal Deficits] : no focal deficits [Normal Speech] : normal speech [Alert and Oriented] : alert and oriented [Normal memory] : normal memory [de-identified] : regular rhythm, grade 1/6 systolic murmur;

## 2021-10-05 NOTE — ASSESSMENT
[FreeTextEntry1] : EKG demonstrating normal sinus rhythm/sinus bradycardia at a rate of 58; low-voltage unipolar release with poor R. progression across the precordial leads; nonspecific T wave changes-  no acute changes;\par \par in summary this 86-year-old woman has a history of normal coronary arteries at cardiac catheterization, yet frequent atypical chest pain syndrome and multiple arthritides An additional somatic complaints;\par \par otherwise she appears hemodynamically stable and no definite; cardiac cause of her atypical chest pain which is most likely musculoskeletal\par \par Plan:\par \par \par \par Recommend transthoracic echocardiogram and carotid duplex study sometime in the near future we'll prior to her next visit within 3-4 months\par \par Recommend followup with her care and possible orthopedic neurologic assessment for neck pain and shoulder discomfort;\par \par Continue current medical regimen;\par \par Patient to report any change in the symptoms between now and next visit;\par \par Timely checkups and laboratory blood tests with primary care encouraged;

## 2021-12-10 ENCOUNTER — APPOINTMENT (OUTPATIENT)
Dept: BREAST CENTER | Facility: CLINIC | Age: 86
End: 2021-12-10

## 2022-01-18 ENCOUNTER — APPOINTMENT (OUTPATIENT)
Dept: CARDIOLOGY | Facility: CLINIC | Age: 87
End: 2022-01-18
Payer: MEDICARE

## 2022-01-18 PROCEDURE — 93306 TTE W/DOPPLER COMPLETE: CPT

## 2022-01-18 PROCEDURE — 93880 EXTRACRANIAL BILAT STUDY: CPT

## 2022-01-25 ENCOUNTER — RESULT CHARGE (OUTPATIENT)
Age: 87
End: 2022-01-25

## 2022-01-26 ENCOUNTER — NON-APPOINTMENT (OUTPATIENT)
Age: 87
End: 2022-01-26

## 2022-01-26 ENCOUNTER — APPOINTMENT (OUTPATIENT)
Dept: CARDIOLOGY | Facility: CLINIC | Age: 87
End: 2022-01-26
Payer: MEDICARE

## 2022-01-26 VITALS
HEIGHT: 60 IN | WEIGHT: 167 LBS | RESPIRATION RATE: 16 BRPM | DIASTOLIC BLOOD PRESSURE: 70 MMHG | HEART RATE: 51 BPM | SYSTOLIC BLOOD PRESSURE: 130 MMHG | BODY MASS INDEX: 32.79 KG/M2

## 2022-01-26 PROCEDURE — 93000 ELECTROCARDIOGRAM COMPLETE: CPT

## 2022-01-26 PROCEDURE — 99214 OFFICE O/P EST MOD 30 MIN: CPT

## 2022-01-26 RX ORDER — LOSARTAN POTASSIUM 50 MG/1
50 TABLET, FILM COATED ORAL
Qty: 180 | Refills: 3 | Status: DISCONTINUED | COMMUNITY
Start: 2017-05-03 | End: 2022-01-26

## 2022-01-26 RX ORDER — LOSARTAN POTASSIUM 50 MG/1
50 TABLET, FILM COATED ORAL
Qty: 90 | Refills: 0 | Status: ACTIVE | COMMUNITY
Start: 2022-01-26

## 2022-01-28 NOTE — PHYSICAL EXAM
[Well Developed] : well developed [Well Nourished] : well nourished [No Acute Distress] : no acute distress [Normal Conjunctiva] : normal conjunctiva [Normal Venous Pressure] : normal venous pressure [No Carotid Bruit] : no carotid bruit [Normal S1, S2] : normal S1, S2 [No Rub] : no rub [No Gallop] : no gallop [Clear Lung Fields] : clear lung fields [Good Air Entry] : good air entry [No Respiratory Distress] : no respiratory distress  [Soft] : abdomen soft [Non Tender] : non-tender [No Masses/organomegaly] : no masses/organomegaly [Normal Bowel Sounds] : normal bowel sounds [Abnormal Gait] : abnormal gait [No Edema] : no edema [No Cyanosis] : no cyanosis [No Clubbing] : no clubbing [No Varicosities] : no varicosities [No Rash] : no rash [No Skin Lesions] : no skin lesions [Moves all extremities] : moves all extremities [No Focal Deficits] : no focal deficits [Normal Speech] : normal speech [Alert and Oriented] : alert and oriented [Normal memory] : normal memory [de-identified] : regular rhythm, grade 1/6 systolic murmur;

## 2022-01-28 NOTE — ASSESSMENT
[FreeTextEntry1] : EKG demonstrated inus bradycardia at a rate of 51. There is RSR prime V1 and V2;  general low-voltage tracing noted and no acute changes;\par \par In summary, this 86-year-old woman has a history for mild hypertension which has been under good control, mild hyperlipidemia and abnormal EKG with prior history of obstructive sleep apnea apparently using CPAP machine nightly. She has had otherwise stable cardiac pattern;\par \par \par \par Plan:\par \par okay to continue present medical regimen recommended;\par \par ;No additional cardiac workup indicated at this time\par \par Followup to office within 4 months or p.r.n.;\par \par Continue timely checkups and laboratory blood tests with primary care recommended;

## 2022-01-28 NOTE — HISTORY OF PRESENT ILLNESS
[FreeTextEntry1] : carotid duplex study from January 18, 2022 showed normal flow bilaterally without any evidence of atherosclerotic plaquing;\par \par Transthoracic echo study from January 18, 2022 shows normal cardiac chamber sizes with normal systolic function of the left ventricle and EF 65-70%. There is mild "diastolic dysfunction". Mild enlarged left atrium.Mild MR aortic sclerosis without stenosis and trace PI;\par \par A cardiac catheterization was performed in 2016 at [facility] and demonstrated no significant obstructive disease with normal LV systolic function;

## 2022-01-28 NOTE — REASON FOR VISIT
[Arrhythmia/ECG Abnorrmalities] : arrhythmia/ECG abnormalities [Structural Heart and Valve Disease] : structural heart and valve disease [Hyperlipidemia] : hyperlipidemia [Hypertension] : hypertension [Other: ____] : [unfilled] [Spouse] : spouse [FreeTextEntry3] : RAFIA Mary [FreeTextEntry1] : The patient is an 86-year-old white female with known history for mild hypertension and hyperlipidemia, obstructive sleep apnea (CPAP mask);\par \par She returns to the office for general cardiac checkup and to discuss results of recent cardiac testing. She is accompanied with her ;\par \par Fortunately, she has had no significant chest pain, shortness of breath, palpitations or dizziness;\par \par She does ambulate with a walker because of some diffuse arthritides;

## 2022-02-23 ENCOUNTER — OUTPATIENT (OUTPATIENT)
Dept: OUTPATIENT SERVICES | Facility: HOSPITAL | Age: 87
LOS: 1 days | Discharge: HOME | End: 2022-02-23

## 2022-02-23 DIAGNOSIS — C50.911 MALIGNANT NEOPLASM OF UNSPECIFIED SITE OF RIGHT FEMALE BREAST: ICD-10-CM

## 2022-02-23 DIAGNOSIS — Z98.890 OTHER SPECIFIED POSTPROCEDURAL STATES: Chronic | ICD-10-CM

## 2022-02-23 DIAGNOSIS — Z98.49 CATARACT EXTRACTION STATUS, UNSPECIFIED EYE: Chronic | ICD-10-CM

## 2022-02-23 DIAGNOSIS — Z90.49 ACQUIRED ABSENCE OF OTHER SPECIFIED PARTS OF DIGESTIVE TRACT: Chronic | ICD-10-CM

## 2022-02-25 ENCOUNTER — APPOINTMENT (OUTPATIENT)
Dept: HEMATOLOGY ONCOLOGY | Facility: CLINIC | Age: 87
End: 2022-02-25
Payer: MEDICARE

## 2022-02-25 VITALS
WEIGHT: 168 LBS | HEART RATE: 59 BPM | OXYGEN SATURATION: 96 % | BODY MASS INDEX: 32.81 KG/M2 | TEMPERATURE: 98 F | RESPIRATION RATE: 18 BRPM | DIASTOLIC BLOOD PRESSURE: 86 MMHG | SYSTOLIC BLOOD PRESSURE: 135 MMHG

## 2022-02-25 PROCEDURE — 99214 OFFICE O/P EST MOD 30 MIN: CPT

## 2022-02-25 NOTE — HISTORY OF PRESENT ILLNESS
[Disease: _____________________] : Disease: [unfilled] [T: ___] : T[unfilled] [N: ___] : N[unfilled] [AJCC Stage: ____] : AJCC Stage: [unfilled] [de-identified] : 81 y/o woman presented with an abnormality in her right breast on a routine mammogram.\par On 6/20/2018 she underwent right breast lumpectomy and sentinel node biopsy.Pathology showed 2.1 cm mod diff infiltrating ductal cancer, strongly ER and [place] positive, HER 2 negative, sentinel node negative. Oncotype recurrence score of 1.  Margins negative. \par \par S/p  adjuvant radiation. \par \par On  Arimidex  since  September 2018.\par \par \par Hx of osteoporosis in the past s/p bisphosphates. Bone density 2018 and 2020- mild osteopenia\par  [de-identified] : moderately differentiated infiltrating ductal cancer,  SBR 6/9, no LVI  [de-identified] : ER 98 %  AK 90 %  HER 2  0  neg    Oncotype recurrence score 1  ( 4 %)  [de-identified] : No new issues Tolerating anastrozole OK.\par Chronic arthritic pains - all over- had for years, no change. S/p fall last year, shoulder injury, had PT.\par \par

## 2022-02-25 NOTE — PHYSICAL EXAM
[Normal] : affect appropriate [de-identified] : looks very well for her age  [de-identified] : fibrotic  periareolar lumpectomy scar right breast and axillary scar,  ; left breast normal exam , no masses [de-identified] : arthritic deformities hands

## 2022-02-25 NOTE — ASSESSMENT
[FreeTextEntry1] : 85 y/o woman with  early stage breast cancer diagnosed in 2018 : s/p right breast lumpectomy / sentinel node biopsy for T 2 ( 2.1 cm) IDC strongly ER/MT positive and HER 2 negative, very low Oncotype recurrence score of 1: prognostic stage I B\par S/p adjuvant radiation.\par \par On Arimidex since September 2018  , tolerating well. Continue Arimidex for 5- 7 years.\par \par Patient has history of osteoporosis ( treated ) in the past.  \par Bone density in 2020 -  mild osteopenia.\par Follow up bone density in August 2022\par Continue Calcium with vit D.  \par \par Breast surveillance imaging  annual  mammogram/ sono  May  2022.\par She will see Dr Angulo next week.\par \par \par Return visit in 6 months. sooner PRN.  D/w patient and her daughter. \par \par

## 2022-02-25 NOTE — REVIEW OF SYSTEMS
[Patient Intake Form Reviewed] : Patient intake form was reviewed [Loss of Hearing] : loss of hearing [Joint Stiffness] : joint stiffness [Joint Pain] : joint pain [Negative] : Allergic/Immunologic [FreeTextEntry9] : per HPI  [de-identified] : memory loss

## 2022-02-28 ENCOUNTER — APPOINTMENT (OUTPATIENT)
Dept: BREAST CENTER | Facility: CLINIC | Age: 87
End: 2022-02-28
Payer: MEDICARE

## 2022-02-28 VITALS
HEIGHT: 60 IN | WEIGHT: 168 LBS | DIASTOLIC BLOOD PRESSURE: 80 MMHG | SYSTOLIC BLOOD PRESSURE: 159 MMHG | HEART RATE: 67 BPM | BODY MASS INDEX: 32.98 KG/M2

## 2022-02-28 DIAGNOSIS — Z08 ENCOUNTER FOR FOLLOW-UP EXAMINATION AFTER COMPLETED TREATMENT FOR MALIGNANT NEOPLASM: ICD-10-CM

## 2022-02-28 DIAGNOSIS — Z51.81 ENCOUNTER FOR THERAPEUTIC DRUG LEVEL MONITORING: ICD-10-CM

## 2022-02-28 DIAGNOSIS — C50.111 MALIGNANT NEOPLASM OF CENTRAL PORTION OF RIGHT FEMALE BREAST: ICD-10-CM

## 2022-02-28 PROCEDURE — 99214 OFFICE O/P EST MOD 30 MIN: CPT

## 2022-02-28 NOTE — CONSULT LETTER
[Dear  ___] : Dear  [unfilled], [Courtesy Letter:] : I had the pleasure of seeing your patient, [unfilled], in my office today. [Please see my note below.] : Please see my note below. [Consult Closing:] : Thank you very much for allowing me to participate in the care of this patient.  If you have any questions, please do not hesitate to contact me. [Sincerely,] : Sincerely, [FreeTextEntry3] : Citlaly Angulo MD FACS  [DrAdelina  ___] : Dr. LEYVA [DrAdelina ___] : Dr. LEYVA

## 2022-02-28 NOTE — DATA REVIEWED
[FreeTextEntry1] : Ultrasound guided core biopsy right breast (1:00)   PATHOLOGY: Moderately differentiated, invasive ductal carcinoma. No LVI.  ER receptor - 98%, MS - 99%, HER2 - negative.\par \par Surgical Pathology:  6/20/18   Findings:  [place] nodes #1 and 2 - negative for malignancy. NOnsentinel node # 1 - negative.   Right lumpectomy;  2.1 cm moderately differentiated infiltrating ductal carcinoma.  NO LVI.  Margins of resection negative.   ER:  98%, MS - 90%, HER2 - negative.  \par \par Mammogram/sono:  05/24/21     Findings:  No suspicious findings. predominantly fatty \par

## 2022-02-28 NOTE — PHYSICAL EXAM
[Normocephalic] : normocephalic [Sclera nonicteric] : sclera nonicteric [Supple] : supple [No Supraclavicular Adenopathy] : no supraclavicular adenopathy [No Cervical Adenopathy] : no cervical adenopathy [Clear to Auscultation Bilat] : clear to auscultation bilaterally [Normal Sinus Rhythm] : normal sinus rhythm [Examined in the supine and seated position] : examined in the supine and seated position [Symmetrical] : symmetrical [Grade 2] : Ptosis Grade 2 [No dominant masses] : no dominant masses in right breast  [No dominant masses] : no dominant masses left breast [No Nipple Retraction] : no left nipple retraction [No Nipple Discharge] : no left nipple discharge [No Axillary Lymphadenopathy] : no left axillary lymphadenopathy [Soft] : abdomen soft [No Edema] : no edema [No Rashes] : no rashes [No Ulceration] : no ulceration [de-identified] : Superior periareolar scar. Slight volume loss. [de-identified] : Transverse scar

## 2022-02-28 NOTE — HISTORY OF PRESENT ILLNESS
[FreeTextEntry1] : Ms. Rogers is an 86 year old woman here for a follow-up for surveillance for breast cancer. 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 Verduzco, 6/20/18) = 2.1 cm tumor, 0/3 R ln\par - Oncotype score = 1\par - s/p radiation (Dr. Moreno)\par - on anastrazole (Dr. Perry)\par \par She has sensitivity by the nipple region that has been stable for years. No palpable breast or axillary lumps, nipple discharge, or skin changes. \par \par Her family history is significant for breast cancer in her daughter (Margarita Dunn, also a patient of mine) at 52.

## 2022-03-01 ENCOUNTER — RX RENEWAL (OUTPATIENT)
Age: 87
End: 2022-03-01

## 2022-05-23 ENCOUNTER — APPOINTMENT (OUTPATIENT)
Dept: CARDIOLOGY | Facility: CLINIC | Age: 87
End: 2022-05-23
Payer: MEDICARE

## 2022-05-23 ENCOUNTER — NON-APPOINTMENT (OUTPATIENT)
Age: 87
End: 2022-05-23

## 2022-05-23 VITALS
RESPIRATION RATE: 16 BRPM | HEIGHT: 60 IN | WEIGHT: 168 LBS | DIASTOLIC BLOOD PRESSURE: 80 MMHG | SYSTOLIC BLOOD PRESSURE: 128 MMHG | HEART RATE: 56 BPM | BODY MASS INDEX: 32.98 KG/M2

## 2022-05-23 PROCEDURE — 99214 OFFICE O/P EST MOD 30 MIN: CPT

## 2022-05-23 PROCEDURE — 93000 ELECTROCARDIOGRAM COMPLETE: CPT

## 2022-05-23 NOTE — REASON FOR VISIT
[Arrhythmia/ECG Abnorrmalities] : arrhythmia/ECG abnormalities [Structural Heart and Valve Disease] : structural heart and valve disease [Hyperlipidemia] : hyperlipidemia [Hypertension] : hypertension [Other: ____] : [unfilled] [Spouse] : spouse [FreeTextEntry3] : RAFIA Mary [FreeTextEntry1] : The patient is a 86-year-old white female who has a history for borderline hypertension and hyperlipidemia with obstructive sleep apnea (CPAP mask and use), who presents back to the office today for general cardiac checkup;\par \par Patient is accompanied with her  who is also a patient;\par \par Overall, she reports that she has been generally feeling well with occasional complaints of arthritis in the upper and lower extremities;\par \par She denies chest pain, shortness of breath, palpitations or dizziness;\par

## 2022-05-23 NOTE — HISTORY OF PRESENT ILLNESS
[FreeTextEntry1] : Patient had a history of mild atherosclerotic plaque on carotid duplex study in January 2022-with normal flow;\par \par Echocardiogram from January 2022 showed preserved LVEF at 65 to 70%.  "Mild diastolic dysfunction", mild enlarged left atrium and mild MR;\par \par ;

## 2022-05-23 NOTE — PHYSICAL EXAM
[Well Developed] : well developed [Well Nourished] : well nourished [No Acute Distress] : no acute distress [Normal Conjunctiva] : normal conjunctiva [Normal Venous Pressure] : normal venous pressure [No Carotid Bruit] : no carotid bruit [Normal S1, S2] : normal S1, S2 [No Rub] : no rub [No Gallop] : no gallop [Clear Lung Fields] : clear lung fields [Good Air Entry] : good air entry [No Respiratory Distress] : no respiratory distress  [Soft] : abdomen soft [Non Tender] : non-tender [No Masses/organomegaly] : no masses/organomegaly [Normal Bowel Sounds] : normal bowel sounds [Normal Gait] : normal gait [No Edema] : no edema [No Cyanosis] : no cyanosis [No Clubbing] : no clubbing [No Varicosities] : no varicosities [No Rash] : no rash [No Skin Lesions] : no skin lesions [Moves all extremities] : moves all extremities [No Focal Deficits] : no focal deficits [Normal Speech] : normal speech [Alert and Oriented] : alert and oriented [Normal memory] : normal memory [de-identified] : Regular rhythm, grade 1/6 to 2/6 soft systolic murmur;

## 2022-08-24 ENCOUNTER — APPOINTMENT (OUTPATIENT)
Dept: BREAST CENTER | Facility: CLINIC | Age: 87
End: 2022-08-24

## 2022-08-24 VITALS
BODY MASS INDEX: 32.98 KG/M2 | HEIGHT: 60 IN | DIASTOLIC BLOOD PRESSURE: 78 MMHG | WEIGHT: 168 LBS | SYSTOLIC BLOOD PRESSURE: 184 MMHG | HEART RATE: 50 BPM

## 2022-08-24 PROCEDURE — 99214 OFFICE O/P EST MOD 30 MIN: CPT

## 2022-08-24 NOTE — PHYSICAL EXAM
[Normocephalic] : normocephalic [Sclera nonicteric] : sclera nonicteric [Supple] : supple [No Supraclavicular Adenopathy] : no supraclavicular adenopathy [No Cervical Adenopathy] : no cervical adenopathy [Clear to Auscultation Bilat] : clear to auscultation bilaterally [Normal Sinus Rhythm] : normal sinus rhythm [Examined in the supine and seated position] : examined in the supine and seated position [Symmetrical] : symmetrical [Grade 2] : Ptosis Grade 2 [No dominant masses] : no dominant masses in right breast  [No dominant masses] : no dominant masses left breast [No Nipple Retraction] : no left nipple retraction [No Nipple Discharge] : no left nipple discharge [No Axillary Lymphadenopathy] : no left axillary lymphadenopathy [Soft] : abdomen soft [No Edema] : no edema [No Rashes] : no rashes [No Ulceration] : no ulceration [de-identified] : Superior periareolar scar. Mild volume loss. [de-identified] : Transverse scar [de-identified] : Sebaceous cyst in between the breasts

## 2022-08-24 NOTE — HISTORY OF PRESENT ILLNESS
[FreeTextEntry1] : Ms. Rogers is an 86 year old woman here for a follow-up for surveillance for breast cancer. 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 Verduzco, 6/20/18) = 2.1 cm tumor, 0/3 R ln\par - Oncotype score = 1\par - s/p radiation (Dr. Moreno)\par - on anastrazole (Dr. Perry)\par \par She is doing well. No breast complaints. 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

## 2022-08-24 NOTE — DATA REVIEWED
[FreeTextEntry1] : Ultrasound guided core biopsy right breast (1:00)   PATHOLOGY: Moderately differentiated, invasive ductal carcinoma. No LVI.  ER receptor - 98%, TN - 99%, HER2 - negative.\par \par Surgical Pathology:  6/20/18   Findings:  [place] nodes #1 and 2 - negative for malignancy. NOnsentinel node # 1 - negative.   Right lumpectomy;  2.1 cm moderately differentiated infiltrating ductal carcinoma.  NO LVI.  Margins of resection negative.   ER:  98%, TN - 90%, HER2 - negative.  \par \par B/l mammogram 8/11/22\par - scattered fibroglandular density \par - BIRADS 2\par \par B/l complete US 8/11/22\par - postsurgical changes on R\par - BIRADS 2\par

## 2022-09-07 ENCOUNTER — RX RENEWAL (OUTPATIENT)
Age: 87
End: 2022-09-07

## 2022-09-14 ENCOUNTER — OUTPATIENT (OUTPATIENT)
Dept: OUTPATIENT SERVICES | Facility: HOSPITAL | Age: 87
LOS: 1 days | Discharge: HOME | End: 2022-09-14

## 2022-09-14 DIAGNOSIS — Z98.890 OTHER SPECIFIED POSTPROCEDURAL STATES: Chronic | ICD-10-CM

## 2022-09-14 DIAGNOSIS — Z98.49 CATARACT EXTRACTION STATUS, UNSPECIFIED EYE: Chronic | ICD-10-CM

## 2022-09-14 DIAGNOSIS — C50.911 MALIGNANT NEOPLASM OF UNSPECIFIED SITE OF RIGHT FEMALE BREAST: ICD-10-CM

## 2022-09-14 DIAGNOSIS — Z90.49 ACQUIRED ABSENCE OF OTHER SPECIFIED PARTS OF DIGESTIVE TRACT: Chronic | ICD-10-CM

## 2022-09-16 ENCOUNTER — APPOINTMENT (OUTPATIENT)
Dept: HEMATOLOGY ONCOLOGY | Facility: CLINIC | Age: 87
End: 2022-09-16

## 2022-09-16 VITALS
HEIGHT: 59.45 IN | DIASTOLIC BLOOD PRESSURE: 69 MMHG | HEART RATE: 51 BPM | SYSTOLIC BLOOD PRESSURE: 167 MMHG | BODY MASS INDEX: 33.12 KG/M2 | OXYGEN SATURATION: 96 % | WEIGHT: 166.5 LBS | RESPIRATION RATE: 18 BRPM | TEMPERATURE: 98 F

## 2022-09-16 PROCEDURE — 99213 OFFICE O/P EST LOW 20 MIN: CPT

## 2022-09-16 RX ORDER — DICLOFENAC SODIUM 75 MG/1
75 TABLET, DELAYED RELEASE ORAL
Qty: 30 | Refills: 0 | Status: DISCONTINUED | COMMUNITY
Start: 2021-08-13 | End: 2022-09-16

## 2022-09-16 RX ORDER — BACLOFEN 10 MG/1
10 TABLET ORAL 3 TIMES DAILY
Qty: 60 | Refills: 1 | Status: DISCONTINUED | COMMUNITY
Start: 2021-08-13 | End: 2022-09-16

## 2022-09-30 DIAGNOSIS — C50.111 MALIGNANT NEOPLASM OF CENTRAL PORTION OF RIGHT FEMALE BREAST: ICD-10-CM

## 2022-09-30 DIAGNOSIS — Z08 ENCOUNTER FOR FOLLOW-UP EXAMINATION AFTER COMPLETED TREATMENT FOR MALIGNANT NEOPLASM: ICD-10-CM

## 2022-09-30 DIAGNOSIS — Z51.81 ENCOUNTER FOR THERAPEUTIC DRUG LEVEL MONITORING: ICD-10-CM

## 2022-09-30 NOTE — PHYSICAL EXAM
[Obese] : obese [Normal] : affect appropriate [de-identified] : looks very well for her age  [de-identified] : anicteric [de-identified] : no lymphadenopathy [de-identified] : LE edema 1 - 2+ R>L, some mild chronic venous stasis changes [de-identified] : no rashes

## 2022-09-30 NOTE — REVIEW OF SYSTEMS
[Patient Intake Form Reviewed] : Patient intake form was reviewed [Loss of Hearing] : loss of hearing [Joint Pain] : joint pain [Joint Stiffness] : joint stiffness [Negative] : Allergic/Immunologic [Constipation] : constipation [Anxiety] : anxiety [Easy Bruising] : a tendency for easy bruising [FreeTextEntry7] : bloating [FreeTextEntry8] : frequency [FreeTextEntry9] : Hand pains from carpal tunnel and arthritis [de-identified] : memory loss, nervousness, some neuropathies in hands from carpal tunnel; occasional lightheadedness - usually in the mornings

## 2022-09-30 NOTE — ASSESSMENT
[FreeTextEntry1] : The patient is an 86 y.o. with hx of an ER strongly positive, T2N0 right BC, s/p right breast lumpectomy - very low Oncotype recurrence score of 1 - pathologic stage IIA, prognostic stage IA, s/p adjuvant radiation.\par On Arimidex since September 2018, tolerating well. Continue Arimidex for 7 years, or until 9/2025.\par \par Continue with routine surveillance:\par Mammo: 8/11/22 - BiRADS 2.  Goes yearly.  Also saw Dr. Angulo 8/24/22.\par GYN: 2/2022 - Dr. Ventura - goes yearly\par Bone Density:  8/10/20 - Spine normal, Femur with T score -1.4 - mild osteopenia.  \par States had repeated 8/2022 - [facility]e - Will request a copy. \par Patient has history of osteoporosis (treated) in the past.  \par Monitor. Continue Calcium with vit D. Bone density in 2 years. \par Colonoscopy - N/A due to age. \par \par Anastrozole renewed\par Return visit in 6 months -> yearly \par \par Dr. Chirag Mary\par Dr. Felix Waters\par Dr. Jessica Verduzco -> Dr. Citlaly Angulo\par Dr. Ventura\par Dr. Susan Moreno

## 2022-09-30 NOTE — REASON FOR VISIT
[Follow-Up Visit] : a follow-up [Spouse] : spouse [FreeTextEntry2] : Breast Cancer - Adjuvant Arimidex ~ 4 years

## 2022-09-30 NOTE — HISTORY OF PRESENT ILLNESS
[Disease: _____________________] : Disease: [unfilled] [T: ___] : T[unfilled] [N: ___] : N[unfilled] [AJCC Stage: ____] : AJCC Stage: [unfilled] [de-identified] : DENNYS SALMON is an 86 y.o. who we are following for an ER+, HER2 neg right sided stage IIA BC.\par \par Presented with an abnormality in her right breast on a routine mammogram.\par 6/20/18 - right breast lumpectomy and SLND - Path with a 2.1cm mod diff IDC, strongly ER and [place] pos, HER2 neg, SLN neg. Oncotype Recurrence score of 1.  Margins negative. \par Had adjuvant radiation. \par \par She has history of osteoporosis in the past and took bisphosphates several years ago. \par She had bone density 8/8/18: mild osteopenia left hip, normal in spine and right hip.  \par 9/2018 - Started Arimidex.\par  [de-identified] : moderately differentiated infiltrating ductal cancer,  SBR 6/9, no LVI  [de-identified] : ER 98%,  ME 90%,   HER2  0/neg    Oncotype recurrence score 1  (4%)  [de-identified] : Feels well.  Has baseline pains in her hands but this is from arthritis and carpal tunnel.  No worse.  \stephan Has had worsening LE edema R>L - was started on water pills by cardiologist.  Wanted to know if Arimidex can be causing this. \stephan Lives with her .  Does not drive.  Her daughter lives nearby. \par \stephan Denies any other changes in her family, medical, or social history since her last visit of 2/25/22

## 2022-10-17 ENCOUNTER — NON-APPOINTMENT (OUTPATIENT)
Age: 87
End: 2022-10-17

## 2022-10-17 ENCOUNTER — APPOINTMENT (OUTPATIENT)
Dept: CARDIOLOGY | Facility: CLINIC | Age: 87
End: 2022-10-17

## 2022-10-17 VITALS
HEIGHT: 59 IN | BODY MASS INDEX: 32.66 KG/M2 | HEART RATE: 53 BPM | SYSTOLIC BLOOD PRESSURE: 148 MMHG | RESPIRATION RATE: 16 BRPM | WEIGHT: 162 LBS | DIASTOLIC BLOOD PRESSURE: 82 MMHG

## 2022-10-17 PROCEDURE — 93000 ELECTROCARDIOGRAM COMPLETE: CPT

## 2022-10-17 PROCEDURE — 99214 OFFICE O/P EST MOD 30 MIN: CPT

## 2022-10-17 NOTE — REASON FOR VISIT
[Arrhythmia/ECG Abnorrmalities] : arrhythmia/ECG abnormalities [Structural Heart and Valve Disease] : structural heart and valve disease [Hyperlipidemia] : hyperlipidemia [Hypertension] : hypertension [Other: ____] : [unfilled] [Spouse] : spouse [FreeTextEntry3] : RAFIA Mary [FreeTextEntry1] : The patient is a 87-year-old white female who has a history for borderline hypertension and hyperlipidemia with obstructive sleep apnea (CPAP mask and use), who presents back to the office today for general cardiac checkup;\par \par Patient is accompanied with her  who is also a patient; she does report that she had a case of COVID-19 along with her .  Fortunately her case was mild and she was recuperating quickly.  Her  was however hospitalized because he was weak and fell;\par \par Overall, she reports that she has been generally feeling well with occasional complaints of arthritis in the upper and lower extremities;\par \par She denies chest pain, shortness of breath, palpitations or dizziness;\par

## 2022-10-17 NOTE — REVIEW OF SYSTEMS
[Joint Pain] : joint pain [Joint Stiffness] : joint stiffness [Hip Problem] : hip problems [Lower Back Pain] : lower back pain [Negative] : Heme/Lymph

## 2022-10-17 NOTE — ASSESSMENT
[FreeTextEntry1] : EKG shows sinus bradycardia; RSR prime V1 and V2.  Diffuse low voltage tracing essentially unchanged;\par \par In summary, this 87-year-old woman has a history for abnormal EKG, mild hypertension and hyperlipidemia with stable cardiac pattern and history of JORGE-using CPAP machine\par Otherwise she has had a stable cardiac pattern;\par \par Plan:\par \par No additional cardiac work-up indicated at this time;\par \par Patient recommended to continue current medical regimen, with the exception of enteric-coated aspirin 81 mg–which should be lowered to 1 tab every other day;\par \par Follow-up to this office within 5 months or as needed;\par \par Regular checkups and laboratory blood test with primary care;;

## 2022-10-17 NOTE — PHYSICAL EXAM
[Well Developed] : well developed [Well Nourished] : well nourished [No Acute Distress] : no acute distress [Normal Conjunctiva] : normal conjunctiva [Normal Venous Pressure] : normal venous pressure [No Carotid Bruit] : no carotid bruit [Normal S1, S2] : normal S1, S2 [No Rub] : no rub [No Gallop] : no gallop [Clear Lung Fields] : clear lung fields [Good Air Entry] : good air entry [No Respiratory Distress] : no respiratory distress  [Soft] : abdomen soft [Non Tender] : non-tender [No Masses/organomegaly] : no masses/organomegaly [Normal Bowel Sounds] : normal bowel sounds [Normal Gait] : normal gait [No Edema] : no edema [No Cyanosis] : no cyanosis [No Clubbing] : no clubbing [No Varicosities] : no varicosities [No Rash] : no rash [No Skin Lesions] : no skin lesions [Moves all extremities] : moves all extremities [No Focal Deficits] : no focal deficits [Normal Speech] : normal speech [Alert and Oriented] : alert and oriented [Normal memory] : normal memory [de-identified] : Regular rhythm, grade 1/6 to 2/6 soft systolic murmur;

## 2022-12-13 ENCOUNTER — APPOINTMENT (OUTPATIENT)
Dept: CARDIOLOGY | Facility: CLINIC | Age: 87
End: 2022-12-13

## 2022-12-13 ENCOUNTER — NON-APPOINTMENT (OUTPATIENT)
Age: 87
End: 2022-12-13

## 2022-12-13 VITALS
HEART RATE: 62 BPM | HEIGHT: 59 IN | SYSTOLIC BLOOD PRESSURE: 138 MMHG | WEIGHT: 163 LBS | RESPIRATION RATE: 16 BRPM | BODY MASS INDEX: 32.86 KG/M2 | DIASTOLIC BLOOD PRESSURE: 78 MMHG

## 2022-12-13 PROCEDURE — 93000 ELECTROCARDIOGRAM COMPLETE: CPT

## 2022-12-13 PROCEDURE — 99214 OFFICE O/P EST MOD 30 MIN: CPT

## 2022-12-13 RX ORDER — SPIRONOLACTONE 25 MG/1
25 TABLET ORAL
Qty: 90 | Refills: 1 | Status: ACTIVE | COMMUNITY
Start: 2022-12-13

## 2022-12-13 RX ORDER — HYDROCHLOROTHIAZIDE 12.5 MG/1
12.5 TABLET ORAL
Qty: 90 | Refills: 1 | Status: DISCONTINUED | COMMUNITY
Start: 2021-09-28 | End: 2022-12-13

## 2022-12-13 NOTE — REVIEW OF SYSTEMS
[Chest Discomfort] : chest discomfort [Joint Pain] : joint pain [Joint Stiffness] : joint stiffness [Hip Problem] : hip problems [Lower Back Pain] : lower back pain [Negative] : Heme/Lymph

## 2022-12-13 NOTE — REASON FOR VISIT
[Arrhythmia/ECG Abnorrmalities] : arrhythmia/ECG abnormalities [Structural Heart and Valve Disease] : structural heart and valve disease [Hyperlipidemia] : hyperlipidemia [Hypertension] : hypertension [Other: ____] : [unfilled] [Spouse] : spouse [FreeTextEntry3] : RAFIA Mary [FreeTextEntry1] : The patient is a 87-year-old white female who has a history for borderline hypertension and hyperlipidemia, and bradycardia,  with obstructive sleep apnea (CPAP mask and use), who presents back to the office today for general cardiac checkup-following recent urgent hospitalization for lightheadedness and dizziness and found to have profound bradycardia with sick sinus syndrome requiring insertion of permanent pacemaker November 1, 2022 ([facility]–Dr. Vargas);\par \par Patient is accompanied with her daughter today and reports that she is still getting us a little discomfort at the pacemaker site in the left upper chest;\par \par She denies other chest pain, shortness of breath, palpitations or dizziness;\par

## 2022-12-13 NOTE — ASSESSMENT
[FreeTextEntry1] : EKG shows probably atrial paced rhythm at a rate of 62 ;  ; RSR prime V1 and V2.  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 and 25 mg in p.m. and continue other medications the same;\par \par Will need to follow-up with primary care and assess potassium levels while on ARB and spironolactone;\par \par Patient recommended to continue current medical regimen, with the exception of enteric-coated aspirin 81 mg–which should be lowered to 1 tab every other day;\par \par Will arrange pacemaker follow-up through the [place] office in the near future and probably home monitoring as well;\par \par Follow-up to this office within 3 months or as needed;\par \par

## 2022-12-13 NOTE — PHYSICAL EXAM
[Well Developed] : well developed [Well Nourished] : well nourished [No Acute Distress] : no acute distress [Normal Conjunctiva] : normal conjunctiva [Normal Venous Pressure] : normal venous pressure [No Carotid Bruit] : no carotid bruit [Normal S1, S2] : normal S1, S2 [No Rub] : no rub [No Gallop] : no gallop [Clear Lung Fields] : clear lung fields [Good Air Entry] : good air entry [No Respiratory Distress] : no respiratory distress  [Soft] : abdomen soft [Non Tender] : non-tender [No Masses/organomegaly] : no masses/organomegaly [Normal Bowel Sounds] : normal bowel sounds [Normal Gait] : normal gait [No Edema] : no edema [No Cyanosis] : no cyanosis [No Clubbing] : no clubbing [No Varicosities] : no varicosities [No Rash] : no rash [No Skin Lesions] : no skin lesions [Moves all extremities] : moves all extremities [No Focal Deficits] : no focal deficits [Normal Speech] : normal speech [Alert and Oriented] : alert and oriented [Normal memory] : normal memory [de-identified] : Regular rhythm, grade 1/6 to 2/6 soft systolic murmur;

## 2022-12-16 ENCOUNTER — APPOINTMENT (OUTPATIENT)
Dept: CARDIOLOGY | Facility: CLINIC | Age: 87
End: 2022-12-16

## 2022-12-16 PROCEDURE — 93288 INTERROG EVL PM/LDLS PM IP: CPT

## 2023-02-17 ENCOUNTER — APPOINTMENT (OUTPATIENT)
Dept: NEUROLOGY | Facility: CLINIC | Age: 88
End: 2023-02-17

## 2023-02-25 ENCOUNTER — OUTPATIENT (OUTPATIENT)
Dept: OUTPATIENT SERVICES | Facility: HOSPITAL | Age: 88
LOS: 1 days | Discharge: HOME | End: 2023-02-25

## 2023-02-25 DIAGNOSIS — Z98.890 OTHER SPECIFIED POSTPROCEDURAL STATES: Chronic | ICD-10-CM

## 2023-02-25 DIAGNOSIS — C50.911 MALIGNANT NEOPLASM OF UNSPECIFIED SITE OF RIGHT FEMALE BREAST: ICD-10-CM

## 2023-02-25 DIAGNOSIS — Z90.49 ACQUIRED ABSENCE OF OTHER SPECIFIED PARTS OF DIGESTIVE TRACT: Chronic | ICD-10-CM

## 2023-02-25 DIAGNOSIS — Z98.49 CATARACT EXTRACTION STATUS, UNSPECIFIED EYE: Chronic | ICD-10-CM

## 2023-03-02 ENCOUNTER — APPOINTMENT (OUTPATIENT)
Dept: BREAST CENTER | Facility: CLINIC | Age: 88
End: 2023-03-02
Payer: MEDICARE

## 2023-03-02 VITALS
BODY MASS INDEX: 32.86 KG/M2 | DIASTOLIC BLOOD PRESSURE: 87 MMHG | WEIGHT: 163 LBS | HEIGHT: 59 IN | SYSTOLIC BLOOD PRESSURE: 146 MMHG | HEART RATE: 82 BPM

## 2023-03-02 DIAGNOSIS — Z12.39 ENCOUNTER FOR OTHER SCREENING FOR MALIGNANT NEOPLASM OF BREAST: ICD-10-CM

## 2023-03-02 PROCEDURE — 99214 OFFICE O/P EST MOD 30 MIN: CPT

## 2023-03-02 NOTE — HISTORY OF PRESENT ILLNESS
[FreeTextEntry1] : Ms. Rogers is an 87 year old woman here for a follow-up for surveillance for breast cancer. 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 Verduzco, 6/20/18) = 2.1 cm tumor, 0/3 R ln\par - Oncotype score = 1\par - s/p radiation (Dr. Moreno)\par - on anastrazole (Dr. Perry)\par \par She is doing well now. No breast lumps. She had COVID in the fall and now also has a pacemaker. Some irritation in the left breast. \par \par Her family history is significant for breast cancer in her daughter (Margarita Dunn, also a patient of mine) at 52.

## 2023-03-02 NOTE — PHYSICAL EXAM
[Normocephalic] : normocephalic [Sclera nonicteric] : sclera nonicteric [Supple] : supple [No Supraclavicular Adenopathy] : no supraclavicular adenopathy [No Cervical Adenopathy] : no cervical adenopathy [Clear to Auscultation Bilat] : clear to auscultation bilaterally [Normal Sinus Rhythm] : normal sinus rhythm [Examined in the supine and seated position] : examined in the supine and seated position [Symmetrical] : symmetrical [Grade 2] : Ptosis Grade 2 [No dominant masses] : no dominant masses in right breast  [No dominant masses] : no dominant masses left breast [No Nipple Retraction] : no left nipple retraction [No Nipple Discharge] : no left nipple discharge [No Axillary Lymphadenopathy] : no left axillary lymphadenopathy [Soft] : abdomen soft [No Edema] : no edema [No Rashes] : no rashes [No Ulceration] : no ulceration [de-identified] : Superior periareolar scar. Mild volume loss. [de-identified] : Transverse scar [de-identified] : Sebaceous cyst in between the breasts

## 2023-03-03 ENCOUNTER — APPOINTMENT (OUTPATIENT)
Dept: HEMATOLOGY ONCOLOGY | Facility: CLINIC | Age: 88
End: 2023-03-03
Payer: MEDICARE

## 2023-03-03 VITALS
WEIGHT: 178.13 LBS | BODY MASS INDEX: 35.98 KG/M2 | TEMPERATURE: 98.4 F | OXYGEN SATURATION: 97 % | DIASTOLIC BLOOD PRESSURE: 76 MMHG | SYSTOLIC BLOOD PRESSURE: 133 MMHG | HEART RATE: 87 BPM | RESPIRATION RATE: 18 BRPM

## 2023-03-03 PROCEDURE — 99214 OFFICE O/P EST MOD 30 MIN: CPT

## 2023-03-03 RX ORDER — ANASTROZOLE TABLETS 1 MG/1
1 TABLET ORAL
Qty: 90 | Refills: 3 | Status: ACTIVE | COMMUNITY
Start: 2018-09-17 | End: 1900-01-01

## 2023-03-03 NOTE — HISTORY OF PRESENT ILLNESS
[Disease: _____________________] : Disease: [unfilled] [T: ___] : T[unfilled] [N: ___] : N[unfilled] [AJCC Stage: ____] : AJCC Stage: [unfilled] [de-identified] : 86 y/o woman presented in 2018 with an abnormality in her right breast on a routine mammogram.\par On 6/20/2018 she underwent right breast lumpectomy and sentinel node biopsy.Pathology showed 2.1 cm mod diff infiltrating ductal cancer, strongly ER and MN positive, HER 2 negative, sentinel node negative. Oncotype recurrence score of 1.  Margins negative. \par \par S/p  adjuvant radiation. \par \par On  Arimidex  since  September 2018.\par \par \par Hx of osteoporosis in the past s/p bisphosphates. Bone density 2018 and 2020- mild osteopenia\par Bone density August 2022 ( [facility]) mild osteopenia ( -1.4  hip)  [de-identified] : moderately differentiated infiltrating ductal cancer,  SBR 6/9, no LVI  [de-identified] : ER 98 %  IA 90 %  HER 2  0  neg    Oncotype recurrence score 1  ( 4 %)  [de-identified] : Covid in September 2022. Hospitalized s/p fall, dgn spinal stenosis . Bradycardia s/p PPM. S/p rehab. now home , continues PT. Overall much better

## 2023-03-03 NOTE — PHYSICAL EXAM
[Normal] : affect appropriate [de-identified] : looks well for her age  [de-identified] : PPM in left upper chest  [de-identified] : P [de-identified] : fibrotic  periareolar lumpectomy scar right breast and axillary scar,  ; left breast normal exam , no masses [de-identified] : arthritic deformities hands

## 2023-03-03 NOTE — ASSESSMENT
[FreeTextEntry1] : 86 y/o woman with  early stage breast cancer diagnosed in 2018 : s/p right breast lumpectomy / sentinel node biopsy for T 2 ( 2.1 cm) IDC strongly ER/[place] positive and HER 2 negative, very low Oncotype recurrence score of 1: prognostic stage I B\par S/p adjuvant radiation.\par \par On Arimidex since September 2018  , tolerating well. Continue Arimidex for 5- 7 years.\par \par Patient has history of osteoporosis ( treated ) in the past.  \par Bone density in 2020 -  mild osteopenia.\par Follow up bone density in August 2022 - mild osteopenai, no change\par Continue Calcium with vit D.  \par \par Breast surveillance imaging  annual  mammogram/ sono  will be due in August \par \par \par Return visit in 6 months. sooner PRN.  D/w patient and her daughter. \par \par

## 2023-03-03 NOTE — REVIEW OF SYSTEMS
[Patient Intake Form Reviewed] : Patient intake form was reviewed [Loss of Hearing] : loss of hearing [Joint Pain] : joint pain [Joint Stiffness] : joint stiffness [Negative] : Allergic/Immunologic [FreeTextEntry9] : per HPI  [de-identified] : memory loss

## 2023-03-06 ENCOUNTER — NON-APPOINTMENT (OUTPATIENT)
Age: 88
End: 2023-03-06

## 2023-03-06 ENCOUNTER — APPOINTMENT (OUTPATIENT)
Dept: CARDIOLOGY | Facility: CLINIC | Age: 88
End: 2023-03-06
Payer: MEDICARE

## 2023-03-06 VITALS — DIASTOLIC BLOOD PRESSURE: 84 MMHG | SYSTOLIC BLOOD PRESSURE: 160 MMHG

## 2023-03-06 VITALS
BODY MASS INDEX: 35.08 KG/M2 | DIASTOLIC BLOOD PRESSURE: 88 MMHG | RESPIRATION RATE: 16 BRPM | WEIGHT: 174 LBS | SYSTOLIC BLOOD PRESSURE: 162 MMHG | HEIGHT: 59 IN | HEART RATE: 76 BPM

## 2023-03-06 DIAGNOSIS — E78.00 PURE HYPERCHOLESTEROLEMIA, UNSPECIFIED: ICD-10-CM

## 2023-03-06 DIAGNOSIS — Z51.81 ENCOUNTER FOR THERAPEUTIC DRUG LEVEL MONITORING: ICD-10-CM

## 2023-03-06 DIAGNOSIS — Z08 ENCOUNTER FOR FOLLOW-UP EXAMINATION AFTER COMPLETED TREATMENT FOR MALIGNANT NEOPLASM: ICD-10-CM

## 2023-03-06 DIAGNOSIS — C50.111 MALIGNANT NEOPLASM OF CENTRAL PORTION OF RIGHT FEMALE BREAST: ICD-10-CM

## 2023-03-06 PROCEDURE — 99214 OFFICE O/P EST MOD 30 MIN: CPT

## 2023-03-06 PROCEDURE — 93000 ELECTROCARDIOGRAM COMPLETE: CPT

## 2023-03-09 NOTE — REASON FOR VISIT
[Arrhythmia/ECG Abnorrmalities] : arrhythmia/ECG abnormalities [Structural Heart and Valve Disease] : structural heart and valve disease [Hyperlipidemia] : hyperlipidemia [Hypertension] : hypertension [Other: ____] : [unfilled] [Spouse] : spouse [FreeTextEntry3] : RAFIA Mary [FreeTextEntry1] : The patient is a 87-year-old white female who has a history for borderline hypertension and hyperlipidemia, and bradycardia,  with obstructive sleep apnea (CPAP mask and use), who presents back to the office today for general cardiac checkup-\par \par (This past year) she had urgent hospitalization for lightheadedness and dizziness and found to have profound bradycardia with sick sinus syndrome requiring insertion of permanent pacemaker November 1, 2022 ([facility]–Dr. Vargas);\par \par Patient is accompanied with her daughter today and reports that she is still getting us a little discomfort at the pacemaker site in the left upper chest;\par \par She denies other chest pain, shortness of breath, palpitations or dizziness;\par

## 2023-03-09 NOTE — PHYSICAL EXAM
[Well Developed] : well developed [Well Nourished] : well nourished [No Acute Distress] : no acute distress [Normal Conjunctiva] : normal conjunctiva [Normal Venous Pressure] : normal venous pressure [No Carotid Bruit] : no carotid bruit [Normal S1, S2] : normal S1, S2 [No Rub] : no rub [No Gallop] : no gallop [Clear Lung Fields] : clear lung fields [Good Air Entry] : good air entry [No Respiratory Distress] : no respiratory distress  [Soft] : abdomen soft [Non Tender] : non-tender [No Masses/organomegaly] : no masses/organomegaly [Normal Bowel Sounds] : normal bowel sounds [Normal Gait] : normal gait [No Edema] : no edema [No Cyanosis] : no cyanosis [No Clubbing] : no clubbing [No Varicosities] : no varicosities [No Rash] : no rash [No Skin Lesions] : no skin lesions [Moves all extremities] : moves all extremities [No Focal Deficits] : no focal deficits [Normal Speech] : normal speech [Alert and Oriented] : alert and oriented [Normal memory] : normal memory [de-identified] : Regular rhythm, grade 1/6 to 2/6 soft systolic murmur;

## 2023-06-08 ENCOUNTER — APPOINTMENT (OUTPATIENT)
Dept: PULMONOLOGY | Facility: CLINIC | Age: 88
End: 2023-06-08
Payer: MEDICARE

## 2023-06-08 VITALS
HEIGHT: 59 IN | HEART RATE: 68 BPM | SYSTOLIC BLOOD PRESSURE: 126 MMHG | WEIGHT: 170 LBS | BODY MASS INDEX: 34.27 KG/M2 | DIASTOLIC BLOOD PRESSURE: 78 MMHG | RESPIRATION RATE: 16 BRPM | OXYGEN SATURATION: 97 %

## 2023-06-08 PROCEDURE — 99214 OFFICE O/P EST MOD 30 MIN: CPT

## 2023-06-08 NOTE — ASSESSMENT
[FreeTextEntry1] : The patient has moderate obstructive sleep apnea and cardiac arrhythmias.  The risk of the small magnets in the mask  with her pacemaker is small.  It certainly is outweighed by the risk of not using her CPAP because she has difficulty putting the mask on.  Therefore she will continue with an F20 AirTouch mask.  I gave her a replacement mask.  Supplies were renewed.  Follow-up will be in 1 year.

## 2023-06-08 NOTE — HISTORY OF PRESENT ILLNESS
[TextBox_4] : Patient with moderate obstructive sleep apnea on AutoPap.  She uses a fullface mask.  Compliance is excellent with residual AHI of 1.3.  The patient developed symptomatic bradycardia and received a permanent pacemaker approximately 5 months ago.  She was told by the [employer] that she could not use the mask that she likes because it has magnets.  There have been no abnormalities on monitoring regarding her pacemaker.  She was given a fullface mask with clips but she has difficulty using it because of arthritis.

## 2023-06-16 ENCOUNTER — APPOINTMENT (OUTPATIENT)
Dept: CARDIOLOGY | Facility: CLINIC | Age: 88
End: 2023-06-16
Payer: MEDICARE

## 2023-06-16 PROCEDURE — 93288 INTERROG EVL PM/LDLS PM IP: CPT

## 2023-07-05 ENCOUNTER — APPOINTMENT (OUTPATIENT)
Dept: CARDIOLOGY | Facility: CLINIC | Age: 88
End: 2023-07-05
Payer: MEDICARE

## 2023-07-05 PROCEDURE — 93296 REM INTERROG EVL PM/IDS: CPT

## 2023-07-05 PROCEDURE — 93294 REM INTERROG EVL PM/LDLS PM: CPT

## 2023-08-28 ENCOUNTER — APPOINTMENT (OUTPATIENT)
Dept: CARDIOLOGY | Facility: CLINIC | Age: 88
End: 2023-08-28
Payer: MEDICARE

## 2023-08-28 VITALS
BODY MASS INDEX: 35.28 KG/M2 | HEART RATE: 77 BPM | DIASTOLIC BLOOD PRESSURE: 80 MMHG | HEIGHT: 59 IN | RESPIRATION RATE: 16 BRPM | WEIGHT: 175 LBS | SYSTOLIC BLOOD PRESSURE: 138 MMHG

## 2023-08-28 DIAGNOSIS — Z99.89 OBSTRUCTIVE SLEEP APNEA (ADULT) (PEDIATRIC): ICD-10-CM

## 2023-08-28 DIAGNOSIS — G47.33 OBSTRUCTIVE SLEEP APNEA (ADULT) (PEDIATRIC): ICD-10-CM

## 2023-08-28 DIAGNOSIS — R60.0 LOCALIZED EDEMA: ICD-10-CM

## 2023-08-28 DIAGNOSIS — R07.89 OTHER CHEST PAIN: ICD-10-CM

## 2023-08-28 PROCEDURE — 93000 ELECTROCARDIOGRAM COMPLETE: CPT

## 2023-08-28 PROCEDURE — 99214 OFFICE O/P EST MOD 30 MIN: CPT

## 2023-08-28 NOTE — ASSESSMENT
4/14/2022 spoke with pt and he said that his eye is much better and he thanked for checking in on him  He also stated that when his overtime is over he will schedule a regular check up  [FreeTextEntry1] : EKG shows probably atrial paced rhythm at a rate of 77 ;  ; RSR prime V1 and V2.  Diffuse low voltage tracing essentially unchanged; no pacemaker activity noted at this time;  In summary, this 87-year-old woman has a history for abnormal EKG, recent symptomatic bradycardia/tachy-bradycardia syndrome requiring permanent pacemaker November 1, 2022 ;  mild hypertension and hyperlipidemia with stable cardiac pattern and history of JORGE-using CPAP machine Otherwise she has had a stable cardiac pattern;   Plan:  Okay to continue current medical regimen:  Will need to follow-up with primary care and assess potassium levels while on ARB and spironolactone;  Continue periodic pacemaker follow-up through the [place];  Return to office within 4 to 5 months or as needed;  Follow-up to this office within 3-4 months or as needed;

## 2023-08-28 NOTE — PHYSICAL EXAM
[Well Developed] : well developed [Well Nourished] : well nourished [No Acute Distress] : no acute distress [Normal Conjunctiva] : normal conjunctiva [Normal Venous Pressure] : normal venous pressure [No Carotid Bruit] : no carotid bruit [Normal S1, S2] : normal S1, S2 [No Rub] : no rub [No Gallop] : no gallop [Clear Lung Fields] : clear lung fields [Good Air Entry] : good air entry [No Respiratory Distress] : no respiratory distress  [Soft] : abdomen soft [Non Tender] : non-tender [No Masses/organomegaly] : no masses/organomegaly [Normal Bowel Sounds] : normal bowel sounds [Normal Gait] : normal gait [No Edema] : no edema [No Cyanosis] : no cyanosis [No Clubbing] : no clubbing [No Varicosities] : no varicosities [No Rash] : no rash [No Skin Lesions] : no skin lesions [Moves all extremities] : moves all extremities [No Focal Deficits] : no focal deficits [Normal Speech] : normal speech [Alert and Oriented] : alert and oriented [Normal memory] : normal memory [de-identified] : Regular rhythm, grade 1/6 to 2/6 soft systolic murmur;

## 2023-08-28 NOTE — HISTORY OF PRESENT ILLNESS
[FreeTextEntry1] : Patient only reports that she would like to lose a few pounds and trying to watch her diet better but has been very sedentary due to arthritic problems of the lower extremities.  ;   Patient had a history of mild atherosclerotic plaque on carotid duplex study in January 2022-with normal flow;  Echocardiogram from January 2022 showed preserved LVEF at 65 to 70%.  "Mild diastolic dysfunction", mild enlarged left atrium and mild MR;  Interrogation of the pacemaker from July 2023 showed normally functioning dual-chamber pacemaker with no arrhythmias seen and battery life of 12+ years remaining;

## 2023-08-28 NOTE — REASON FOR VISIT
[Arrhythmia/ECG Abnorrmalities] : arrhythmia/ECG abnormalities [Structural Heart and Valve Disease] : structural heart and valve disease [Hyperlipidemia] : hyperlipidemia [Hypertension] : hypertension [Other: ____] : [unfilled] [Spouse] : spouse [FreeTextEntry3] : RAFIA Mary [FreeTextEntry1] : The patient is a 87-year-old white female who has a history for borderline hypertension and hyperlipidemia, and bradycardia,  with obstructive sleep apnea (CPAP mask and use), who presents back to the office today for general cardiac checkup-  (This past year) she had urgent hospitalization for lightheadedness and dizziness and found to have profound bradycardia with sick sinus syndrome requiring insertion of permanent pacemaker November 1, 2022 ([facility]-Dr. Vargas); currently being monitored through our office in [place];  Patient is accompanied with her  today for general cardiac checkup;  She denies any significant chest pain, shortness of breath, palpitations or dizziness;

## 2023-08-28 NOTE — REVIEW OF SYSTEMS
[Joint Pain] : joint pain [Joint Stiffness] : joint stiffness [Hip Problem] : hip problems [Knee Problem] : knee problems [Knee Pain] : knee pain [Lower Back Pain] : lower back pain [Negative] : Heme/Lymph

## 2023-09-12 ENCOUNTER — OUTPATIENT (OUTPATIENT)
Dept: OUTPATIENT SERVICES | Facility: HOSPITAL | Age: 88
LOS: 1 days | Discharge: HOME | End: 2023-09-12

## 2023-09-12 DIAGNOSIS — Z98.890 OTHER SPECIFIED POSTPROCEDURAL STATES: Chronic | ICD-10-CM

## 2023-09-12 DIAGNOSIS — Z90.49 ACQUIRED ABSENCE OF OTHER SPECIFIED PARTS OF DIGESTIVE TRACT: Chronic | ICD-10-CM

## 2023-09-12 DIAGNOSIS — Z98.49 CATARACT EXTRACTION STATUS, UNSPECIFIED EYE: Chronic | ICD-10-CM

## 2023-09-12 DIAGNOSIS — C50.911 MALIGNANT NEOPLASM OF UNSPECIFIED SITE OF RIGHT FEMALE BREAST: ICD-10-CM

## 2023-09-13 ENCOUNTER — APPOINTMENT (OUTPATIENT)
Dept: SURGERY | Facility: CLINIC | Age: 88
End: 2023-09-13
Payer: MEDICARE

## 2023-09-13 VITALS
OXYGEN SATURATION: 97 % | HEIGHT: 59 IN | HEART RATE: 77 BPM | BODY MASS INDEX: 35.28 KG/M2 | DIASTOLIC BLOOD PRESSURE: 67 MMHG | SYSTOLIC BLOOD PRESSURE: 127 MMHG | WEIGHT: 175 LBS

## 2023-09-13 DIAGNOSIS — M25.561 PAIN IN RIGHT KNEE: ICD-10-CM

## 2023-09-13 DIAGNOSIS — M25.562 PAIN IN RIGHT KNEE: ICD-10-CM

## 2023-09-13 PROCEDURE — 99214 OFFICE O/P EST MOD 30 MIN: CPT

## 2023-09-14 ENCOUNTER — APPOINTMENT (OUTPATIENT)
Dept: BREAST CENTER | Facility: CLINIC | Age: 88
End: 2023-09-14
Payer: MEDICARE

## 2023-09-14 VITALS
DIASTOLIC BLOOD PRESSURE: 79 MMHG | WEIGHT: 175 LBS | SYSTOLIC BLOOD PRESSURE: 127 MMHG | HEIGHT: 59 IN | BODY MASS INDEX: 35.28 KG/M2 | HEART RATE: 76 BPM

## 2023-09-14 PROBLEM — M48.00 SPINAL STENOSIS: Status: ACTIVE | Noted: 2023-09-14

## 2023-09-14 PROBLEM — U07.1 COVID-19 VIREMIA: Status: RESOLVED | Noted: 2023-09-14 | Resolved: 2023-09-14

## 2023-09-14 PROCEDURE — 99214 OFFICE O/P EST MOD 30 MIN: CPT

## 2023-09-15 ENCOUNTER — APPOINTMENT (OUTPATIENT)
Dept: HEMATOLOGY ONCOLOGY | Facility: CLINIC | Age: 88
End: 2023-09-15
Payer: MEDICARE

## 2023-09-15 VITALS
BODY MASS INDEX: 35.95 KG/M2 | DIASTOLIC BLOOD PRESSURE: 79 MMHG | TEMPERATURE: 97.9 F | SYSTOLIC BLOOD PRESSURE: 118 MMHG | RESPIRATION RATE: 20 BRPM | OXYGEN SATURATION: 96 % | HEART RATE: 83 BPM | WEIGHT: 178 LBS

## 2023-09-15 DIAGNOSIS — C50.111 MALIGNANT NEOPLASM OF CENTRAL PORTION OF RIGHT FEMALE BREAST: ICD-10-CM

## 2023-09-15 DIAGNOSIS — U07.1 COVID-19: ICD-10-CM

## 2023-09-15 DIAGNOSIS — Z51.81 ENCOUNTER FOR THERAPEUTIC DRUG LEVEL MONITORING: ICD-10-CM

## 2023-09-15 DIAGNOSIS — M85.80 OTHER SPECIFIED DISORDERS OF BONE DENSITY AND STRUCTURE, UNSPECIFIED SITE: ICD-10-CM

## 2023-09-15 DIAGNOSIS — Z51.81 ENCOUNTER FOR THERAPEUTIC DRUG LVL MONITORING: ICD-10-CM

## 2023-09-15 DIAGNOSIS — Z08 ENCOUNTER FOR FOLLOW-UP EXAMINATION AFTER COMPLETED TREATMENT FOR MALIGNANT NEOPLASM: ICD-10-CM

## 2023-09-15 DIAGNOSIS — Z85.3 ENCOUNTER FOR FOLLOW-UP EXAMINATION AFTER COMPLETED TREATMENT FOR MALIGNANT NEOPLASM: ICD-10-CM

## 2023-09-15 DIAGNOSIS — Z17.0 MALIGNANT NEOPLASM OF CENTRAL PORTION OF RIGHT FEMALE BREAST: ICD-10-CM

## 2023-09-15 DIAGNOSIS — Z79.899 ENCOUNTER FOR THERAPEUTIC DRUG LVL MONITORING: ICD-10-CM

## 2023-09-15 DIAGNOSIS — M48.00 SPINAL STENOSIS, SITE UNSPECIFIED: ICD-10-CM

## 2023-09-15 PROCEDURE — 99214 OFFICE O/P EST MOD 30 MIN: CPT

## 2023-10-03 ENCOUNTER — APPOINTMENT (OUTPATIENT)
Dept: CARDIOLOGY | Facility: CLINIC | Age: 88
End: 2023-10-03
Payer: MEDICARE

## 2023-10-03 PROCEDURE — 93294 REM INTERROG EVL PM/LDLS PM: CPT

## 2023-10-03 PROCEDURE — 93296 REM INTERROG EVL PM/IDS: CPT

## 2023-11-06 NOTE — ASSESSMENT
[FreeTextEntry1] : EKG shows normal sinus rhythm at a rate of 85; RSR prime V1 and V2.  Diffuse low voltage tracing essentially unchanged;\par \par In summary, this 86-year-old woman has a history for abnormal EKG, mild hypertension and hyperlipidemia with stable cardiac pattern and history of JORGE-using CPAP machine\par Otherwise she has had a stable cardiac pattern;\par \par Plan:\par \par No additional cardiac work-up indicated at this time;\par \par Patient recommended to continue current medical regimen, with the exception of enteric-coated aspirin 81 mg–which should be lowered to 1 tab every other day;\par \par Follow-up to this office within 5 months or as needed;\par \par Regular checkups and laboratory blood test with primary care;;
Renal Diets (for dialysis)

## 2023-12-15 ENCOUNTER — APPOINTMENT (OUTPATIENT)
Dept: CARDIOLOGY | Facility: CLINIC | Age: 88
End: 2023-12-15
Payer: MEDICARE

## 2023-12-15 PROCEDURE — 93288 INTERROG EVL PM/LDLS PM IP: CPT

## 2024-01-29 ENCOUNTER — APPOINTMENT (OUTPATIENT)
Dept: CARDIOLOGY | Facility: CLINIC | Age: 89
End: 2024-01-29
Payer: MEDICARE

## 2024-01-29 ENCOUNTER — NON-APPOINTMENT (OUTPATIENT)
Age: 89
End: 2024-01-29

## 2024-01-29 VITALS
DIASTOLIC BLOOD PRESSURE: 70 MMHG | BODY MASS INDEX: 35.28 KG/M2 | WEIGHT: 175 LBS | HEIGHT: 59 IN | RESPIRATION RATE: 16 BRPM | HEART RATE: 70 BPM | SYSTOLIC BLOOD PRESSURE: 126 MMHG

## 2024-01-29 DIAGNOSIS — R00.2 PALPITATIONS: ICD-10-CM

## 2024-01-29 DIAGNOSIS — R94.31 ABNORMAL ELECTROCARDIOGRAM [ECG] [EKG]: ICD-10-CM

## 2024-01-29 DIAGNOSIS — R01.1 CARDIAC MURMUR, UNSPECIFIED: ICD-10-CM

## 2024-01-29 DIAGNOSIS — I10 ESSENTIAL (PRIMARY) HYPERTENSION: ICD-10-CM

## 2024-01-29 PROCEDURE — 93000 ELECTROCARDIOGRAM COMPLETE: CPT

## 2024-01-29 PROCEDURE — 99214 OFFICE O/P EST MOD 30 MIN: CPT

## 2024-01-29 NOTE — REASON FOR VISIT
[Arrhythmia/ECG Abnorrmalities] : arrhythmia/ECG abnormalities [Structural Heart and Valve Disease] : structural heart and valve disease [Hyperlipidemia] : hyperlipidemia [Hypertension] : hypertension [Other: ____] : [unfilled] [Spouse] : spouse [FreeTextEntry3] : RAFIA Mary [FreeTextEntry1] : The patient is a 88-year-old white female who has a history for borderline hypertension and hyperlipidemia, and bradycardia, with obstructive sleep apnea (CPAP mask and use), who presents back to the office today for general cardiac checkup-  (This past year) she had urgent hospitalization for lightheadedness and dizziness and found to have profound bradycardia with sick sinus syndrome requiring insertion of permanent pacemaker November 1, 2022 ([facility]-Dr. Vargas); currently being monitored through our office in [place];  Patient is accompanied with her  and daughter today for general cardiac checkup;  She denies any significant chest pain, shortness of breath, palpitations or dizziness;

## 2024-01-29 NOTE — ASSESSMENT
[FreeTextEntry1] : EKG shows probably atrial paced rhythm at a rate of 70 ;  ; RSR prime V1 and V2.  Diffuse low voltage tracing essentially unchanged;   In summary, this 88-year-old woman has a history for abnormal EKG, recent symptomatic bradycardia/tachy-bradycardia syndrome requiring permanent pacemaker November 1, 2022 ;  mild hypertension and hyperlipidemia with stable cardiac pattern and history of JORGE-using CPAP machine Otherwise she has had a stable cardiac pattern;   Plan:  Okay to continue current medical regimen:  Review recent laboratory blood tests and electrolytes;  Continue periodic pacemaker follow-up through the [facility];  Return to office within 4 to 5 months or as needed;  Follow-up to this office within 3-4 months or as needed;

## 2024-01-29 NOTE — PHYSICAL EXAM
[Well Developed] : well developed [Well Nourished] : well nourished [No Acute Distress] : no acute distress [Normal Conjunctiva] : normal conjunctiva [Normal Venous Pressure] : normal venous pressure [No Carotid Bruit] : no carotid bruit [Normal S1, S2] : normal S1, S2 [No Rub] : no rub [No Gallop] : no gallop [Clear Lung Fields] : clear lung fields [Good Air Entry] : good air entry [No Respiratory Distress] : no respiratory distress  [Soft] : abdomen soft [Non Tender] : non-tender [No Masses/organomegaly] : no masses/organomegaly [Normal Bowel Sounds] : normal bowel sounds [Normal Gait] : normal gait [No Edema] : no edema [No Cyanosis] : no cyanosis [No Clubbing] : no clubbing [No Varicosities] : no varicosities [No Rash] : no rash [No Skin Lesions] : no skin lesions [Moves all extremities] : moves all extremities [No Focal Deficits] : no focal deficits [Normal Speech] : normal speech [Alert and Oriented] : alert and oriented [Normal memory] : normal memory [de-identified] : Regular rhythm, grade 1/6 to 2/6 soft systolic murmur;

## 2024-01-29 NOTE — REVIEW OF SYSTEMS
[Joint Pain] : joint pain [Joint Stiffness] : joint stiffness [Hip Problem] : hip problems [Knee Problem] : knee problems [Knee Pain] : knee pain [Lower Back Pain] : lower back pain [Negative] : Heme/Lymph [FreeTextEntry9] : Cervical posterior neck discomfort and occipital head pain;

## 2024-01-29 NOTE — HISTORY OF PRESENT ILLNESS
[FreeTextEntry1] : Patient states that she would like to lose a few pounds and trying to watch her diet better but has been very sedentary due to arthritic problems of the lower extremities.  ; She has some other somatic complaints such as arthritides of her cervical spine and neck for which she recently had MRI and to see a neurologist in the near future (Dr. Castorena)  Patient had a history of mild atherosclerotic plaque on carotid duplex study in January 2022-with normal flow;  Echocardiogram from January 2022 showed preserved LVEF at 65 to 70%.  "Mild diastolic dysfunction", mild enlarged left atrium and mild MR;  Interrogation of the pacemaker from Dec 2023 showed normally functioning dual-chamber pacemaker with no arrhythmias seen and battery life of 12+ years remaining;

## 2024-02-20 NOTE — DATA REVIEWED
[FreeTextEntry1] : Ultrasound guided core biopsy right breast (1:00)   PATHOLOGY: Moderately differentiated, invasive ductal carcinoma. No LVI.  ER receptor - 98%, OK - 99%, HER2 - negative.\par \par Surgical Pathology:  6/20/18   Findings:  Lincoln nodes #1 and 2 - negative for malignancy. NOnsentinel node # 1 - negative.   Right lumpectomy;  2.1 cm moderately differentiated infiltrating ductal carcinoma.  NO LVI.  Margins of resection negative.   ER:  98%, OK - 90%, HER2 - negative.  \par \par B/l mammogram 8/11/22\par - scattered fibroglandular density \par - BIRADS 2\par \par B/l complete US 8/11/22\par - postsurgical changes on R\par - BIRADS 2\par  There are no Wet Read(s) to document. There is 1 Wet Read(s) to document.

## 2024-02-28 RX ORDER — HYDRALAZINE HYDROCHLORIDE 25 MG/1
25 TABLET ORAL
Qty: 180 | Refills: 3 | Status: ACTIVE | COMMUNITY
Start: 2020-11-11 | End: 1900-01-01

## 2024-03-01 ENCOUNTER — APPOINTMENT (OUTPATIENT)
Dept: ORTHOPEDIC SURGERY | Facility: CLINIC | Age: 89
End: 2024-03-01
Payer: MEDICARE

## 2024-03-01 VITALS — HEIGHT: 59 IN | WEIGHT: 175 LBS | BODY MASS INDEX: 35.28 KG/M2

## 2024-03-01 DIAGNOSIS — M47.812 SPONDYLOSIS W/OUT MYELOPATHY OR RADICULOPATHY, CERVICAL REGION: ICD-10-CM

## 2024-03-01 PROCEDURE — 99204 OFFICE O/P NEW MOD 45 MIN: CPT

## 2024-03-03 NOTE — DISCUSSION/SUMMARY
[de-identified] : 89 y/o F with chronic neck pain and spinal cord compression. Recommend patient starts course of physical therapy and continued follow up with Dr. Castorena. Referral provided for PT. We did caution the patient to be aware for progressive neurological deficits in terms of strength in the arms, balance, and gait. Patients son expressed a clear understanding and will monitor this. Discussed possible interventional spine injections vs surgical decompression/fusion dependent if sxs become worse. FUV 4 wks for repeat neurologic examination.   Prior to appointment and during encounter with patient extensive medical records were reviewed including but not limited to, hospital records, outpatient records, imaging results, and lab data.During this appointment the patient was examined, diagnoses were discussed and explained in a face to face manner. In addition extensive time was spent reviewing aforementioned diagnostic studies. Counseling including abnormal image results, differential diagnoses, treatment options, risk and benefits, lifestyle changes, current condition, and current medications was performed. Patient's comments, questions, and concerns were addressed and patient verbalized understanding. Based on this patient's presentation at our office, which is an orthopedic spine surgeon's office, this patient inherently / intrinsically has a risk, however minute, of developing issues such as Cauda equina syndrome, bowel and bladder changes, or progression of motor or neurological deficits such as paralysis which may be permanent.  BETSY YEUNG Acting as a Scribe for Dr. Oumar ZHAO, Betsy Yeung, attest that this documentation has been prepared under the direction and in the presence of Provider Sundar Oseguera MD.

## 2024-03-03 NOTE — PHYSICAL EXAM
[Triceps 2+] : triceps 2+ [Biceps 2+] : biceps 2+ [Brachioradialis 2+] : brachioradialis 2+ [] : ambulation with cane [Normal Coordination] : normal coordination [Normal DTR UE/LE] : normal DTR UE/LE  [Normal Sensation] : normal sensation [Normal Mood and Affect] : normal mood and affect [Able to Communicate] : able to communicate [Oriented] : oriented [No Rash] : no rash [Normal Skin] : normal skin [No Ulcers] : no ulcers [No Lesions] : no lesions [No obvious lymphadenopathy in areas examined] : no obvious lymphadenopathy in areas examined [No Respiratory Distress] : no respiratory distress [Peripheral vascular exam is grossly normal] : peripheral vascular exam is grossly normal [Well Developed] : well developed [de-identified] : Constitutional: - General Appearance: Unremarkable Body Habitus Well Developed Well Nourished Body Habitus No Deformities Well Groomed Ability To communicate: Normal Neurologic: Global sensation is intact to upper and lower extremities. See examination of Neck and/or Spine for exceptions. Orientation to Time, Place and Person is: Normal Mood And Affect is Normal Skin: - Head/Face, Right Upper/Lower Extremity, Left Upper/Lower Extremity: Normal See Examination of Neck and/or Spine for exceptions Cardiovascular: Peripheral Cardiovascular System is Normal Palpation of Lymph Nodes: Normal Palpation of lymph nodes in: Axilla, Cervical, Inguinal Abnormal Palpation of lymph nodes in: None

## 2024-03-03 NOTE — DATA REVIEWED
[MRI] : MRI [Report was reviewed and noted in the chart] : The report was reviewed and noted in the chart [Cervical Spine] : cervical spine [I reviewed the films/CD and additionally noted] : I reviewed the films/CD and additionally noted [I independently reviewed and interpreted images and report] : I independently reviewed and interpreted images and report [FreeTextEntry1] : January 2024 - multi level DDD, spinal cord compression

## 2024-03-03 NOTE — HISTORY OF PRESENT ILLNESS
[de-identified] : 03/01/2024 - 87 y/o F presenting for initial evaluation with chief complaint of chronic neck pain. Also c/o pain radiating into the arm, dizziness, and instability. She presents in office with her son. She reports being diagnosed with cervical DDD and stenosis approx 2 years ago. She has been under care with Dr. Castorena who recommended PT but she did not start this yet. She is unable to specify if her dexterity is intact, or a specific reason as to why she is falling.   The patient is a 88 year female who presents today complaining of neck pain radiating down right arm Date of Injury/Onset: chronic Pain:    At Rest: /10  With Activity:  /10  Mechanism of injury: no specific injury Quality of symptoms: dizziness, achy, sharp,  Improves with: tpi Worse with: weather, lying down Prior treatment: Dr Trace Castorena Prior Imaging: [facility] Cervical Spine Additional Information: None

## 2024-03-06 ENCOUNTER — APPOINTMENT (OUTPATIENT)
Dept: CARDIOLOGY | Facility: CLINIC | Age: 89
End: 2024-03-06
Payer: MEDICARE

## 2024-03-06 PROCEDURE — 93294 REM INTERROG EVL PM/LDLS PM: CPT

## 2024-03-06 PROCEDURE — 93296 REM INTERROG EVL PM/IDS: CPT

## 2024-04-10 PROBLEM — I72.0 CAROTID ANEURYSM, RIGHT: Status: ACTIVE | Noted: 2024-04-10

## 2024-04-10 RX ORDER — GABAPENTIN 100 MG/1
CAPSULE ORAL
Refills: 0 | Status: ACTIVE | COMMUNITY

## 2024-04-10 RX ORDER — TRAMADOL HYDROCHLORIDE 25 MG/1
TABLET, COATED ORAL
Refills: 0 | Status: ACTIVE | COMMUNITY

## 2024-04-11 ENCOUNTER — APPOINTMENT (OUTPATIENT)
Dept: NEUROLOGY | Facility: CLINIC | Age: 89
End: 2024-04-11
Payer: MEDICARE

## 2024-04-11 VITALS
OXYGEN SATURATION: 98 % | SYSTOLIC BLOOD PRESSURE: 146 MMHG | WEIGHT: 170 LBS | HEIGHT: 59 IN | BODY MASS INDEX: 34.27 KG/M2 | HEART RATE: 80 BPM | DIASTOLIC BLOOD PRESSURE: 91 MMHG

## 2024-04-11 DIAGNOSIS — I72.0 ANEURYSM OF CAROTID ARTERY: ICD-10-CM

## 2024-04-11 PROCEDURE — 99204 OFFICE O/P NEW MOD 45 MIN: CPT

## 2024-04-11 NOTE — HISTORY OF PRESENT ILLNESS
[FreeTextEntry1] : 89 YO female, presenting today on referral from Dr Castorena for abnormal MRA Head findings, 3mm R cavernous carotid artery aneurysm. MRA was performed for dizziness and gait abnormalities. Pt reports chronic pain in her knees, shoulders, and neck. Pt reports multiple falls in the past 3 years. Denies any new focal weakness, numbness, visual changes, speech or language abnormalities.

## 2024-04-11 NOTE — DATA REVIEWED
[de-identified] : MRA Head 1/3/24: Findings: Anterior circulation: Normal flow related signal is identified in intracranial segment of both internal carotid arteries and their major branches. There is no grossly stenotic or occlusive lesion.  There is a the 3 mm aneurysm arising from medial surface of the right cavernous carotid artery.  Posterior circulation: The right vertebral artery is not visualized which could be due to developmentally hypoplastic vessel and intrinsic limitations of noncontrast technique (most common cause of nonvisualization of noncontrast MRA). However superimposed stenosis acquired disease of the right vertebral artery with decreased flow is not excluded. If more definitive evaluation is clinical concern contrast-enhanced MRA of the head and neck could be obtained .  There is no evidence of aneurysm.  Other findings: Diffusion-weighted imaging of the brain demonstrates no evidence of acute infarct.  IMPRESSION:  1. 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 2. The right vertebral artery is not visualized which could be due to developmentally hypoplastic vessel and intrinsic limitations of noncontrast technique . However superimposed stenosis of the right vertebral artery with decreased flow is not excluded.

## 2024-04-11 NOTE — ASSESSMENT
[FreeTextEntry1] : 87 YO F w/incidentally found right cavernous ICA aneurysm measuring ~3mm - no neurological symptoms of the aneurysm reported, No personal or family hx of ruptured aneurysms. - recommend reimaging in 1 year with MRA head - recommend one year follow up

## 2024-04-11 NOTE — PHYSICAL EXAM
[FreeTextEntry1] : Awake, alert, oriented x 3 Primarily R[place]ian speaking  Followed all commands SYED, EOMI, Face symmetric, facial sensation intact to LT, tongue midline BLUEs strength is 5/5 BLLEs strength is 5-/5 (limited by pain) Sensation intact to LT throughout FTN intact BL Gait: walks with a pak

## 2024-04-19 ENCOUNTER — APPOINTMENT (OUTPATIENT)
Dept: ORTHOPEDIC SURGERY | Facility: CLINIC | Age: 89
End: 2024-04-19

## 2024-06-05 ENCOUNTER — APPOINTMENT (OUTPATIENT)
Dept: CARDIOLOGY | Facility: CLINIC | Age: 89
End: 2024-06-05
Payer: MEDICARE

## 2024-06-05 PROCEDURE — 93294 REM INTERROG EVL PM/LDLS PM: CPT

## 2024-06-05 PROCEDURE — 93296 REM INTERROG EVL PM/IDS: CPT

## 2024-07-05 ENCOUNTER — APPOINTMENT (OUTPATIENT)
Dept: CARDIOLOGY | Facility: CLINIC | Age: 89
End: 2024-07-05

## 2024-07-05 ENCOUNTER — APPOINTMENT (OUTPATIENT)
Dept: CARDIOLOGY | Facility: CLINIC | Age: 89
End: 2024-07-05
Payer: MEDICARE

## 2024-07-05 ENCOUNTER — NON-APPOINTMENT (OUTPATIENT)
Age: 89
End: 2024-07-05

## 2024-07-05 VITALS
SYSTOLIC BLOOD PRESSURE: 127 MMHG | WEIGHT: 171 LBS | BODY MASS INDEX: 34.47 KG/M2 | DIASTOLIC BLOOD PRESSURE: 76 MMHG | RESPIRATION RATE: 16 BRPM | HEIGHT: 59 IN | HEART RATE: 62 BPM

## 2024-07-05 DIAGNOSIS — R94.31 ABNORMAL ELECTROCARDIOGRAM [ECG] [EKG]: ICD-10-CM

## 2024-07-05 DIAGNOSIS — E78.00 PURE HYPERCHOLESTEROLEMIA, UNSPECIFIED: ICD-10-CM

## 2024-07-05 DIAGNOSIS — R07.89 OTHER CHEST PAIN: ICD-10-CM

## 2024-07-05 DIAGNOSIS — R01.1 CARDIAC MURMUR, UNSPECIFIED: ICD-10-CM

## 2024-07-05 DIAGNOSIS — I10 ESSENTIAL (PRIMARY) HYPERTENSION: ICD-10-CM

## 2024-07-05 DIAGNOSIS — R00.2 PALPITATIONS: ICD-10-CM

## 2024-07-05 PROCEDURE — 99214 OFFICE O/P EST MOD 30 MIN: CPT

## 2024-07-05 PROCEDURE — 93281 PM DEVICE PROGR EVAL MULTI: CPT

## 2024-07-05 PROCEDURE — 93000 ELECTROCARDIOGRAM COMPLETE: CPT | Mod: 59

## 2024-07-05 PROCEDURE — G2211 COMPLEX E/M VISIT ADD ON: CPT

## 2024-09-26 ENCOUNTER — APPOINTMENT (OUTPATIENT)
Dept: BREAST CENTER | Facility: CLINIC | Age: 89
End: 2024-09-26
Payer: MEDICARE

## 2024-09-26 VITALS
WEIGHT: 170 LBS | DIASTOLIC BLOOD PRESSURE: 80 MMHG | HEIGHT: 60 IN | HEART RATE: 79 BPM | BODY MASS INDEX: 33.38 KG/M2 | SYSTOLIC BLOOD PRESSURE: 133 MMHG

## 2024-09-26 DIAGNOSIS — Z08 ENCOUNTER FOR FOLLOW-UP EXAMINATION AFTER COMPLETED TREATMENT FOR MALIGNANT NEOPLASM: ICD-10-CM

## 2024-09-26 DIAGNOSIS — Z91.89 OTHER SPECIFIED PERSONAL RISK FACTORS, NOT ELSEWHERE CLASSIFIED: ICD-10-CM

## 2024-09-26 DIAGNOSIS — Z85.3 ENCOUNTER FOR FOLLOW-UP EXAMINATION AFTER COMPLETED TREATMENT FOR MALIGNANT NEOPLASM: ICD-10-CM

## 2024-09-26 PROCEDURE — 99213 OFFICE O/P EST LOW 20 MIN: CPT

## 2024-09-26 NOTE — PHYSICAL EXAM
[Normocephalic] : normocephalic [Sclera nonicteric] : sclera nonicteric [Supple] : supple [No Supraclavicular Adenopathy] : no supraclavicular adenopathy [No Cervical Adenopathy] : no cervical adenopathy [Clear to Auscultation Bilat] : clear to auscultation bilaterally [Normal Sinus Rhythm] : normal sinus rhythm [Examined in the supine and seated position] : examined in the supine and seated position [Symmetrical] : symmetrical [Grade 2] : Ptosis Grade 2 [No dominant masses] : no dominant masses in right breast  [No dominant masses] : no dominant masses left breast [No Nipple Retraction] : no left nipple retraction [No Nipple Discharge] : no left nipple discharge [No Axillary Lymphadenopathy] : no left axillary lymphadenopathy [Soft] : abdomen soft [No Edema] : no edema [No Rashes] : no rashes [No Ulceration] : no ulceration [Atraumatic] : atraumatic [Not Tender] : non-tender [de-identified] : Superior periareolar scar. Mild volume loss. 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

## 2024-09-26 NOTE — HISTORY OF PRESENT ILLNESS
[FreeTextEntry1] : Ms. Rogers is an 88 year old woman here for a follow-up for surveillance for breast cancer. Her breast history is significant for  1.) Right infiltrating ductal carcinoma diagnosed in 2018 at age 82, pathologic stage II (prognostic stage I), pT2 pN0, ER 98%, NM 90%, Her2 0, SBR 6/9 - s/p R lumpectomy, sentinel node biopsy (Dr. Verduzco, 6/20/18) = 2.1 cm tumor, 0/3 R ln - Oncotype score = 1 - s/p radiation (Dr. Moreno) -  Anastrazole (Dr. Perry) - completed 5 years  She has no breast complaints. No lumps. Her arthritis is worse with age.  Her family history is significant for breast cancer in her daughter (Margarita Dunn, also a patient of mine) at 52. 
No

## 2024-09-26 NOTE — DATA REVIEWED
[FreeTextEntry1] : Bilateral screening mammogram  8/22/24: Scattered fibroglandular density . No mammographic evidence of malignancy.

## 2024-12-06 ENCOUNTER — OUTPATIENT (OUTPATIENT)
Dept: OUTPATIENT SERVICES | Facility: HOSPITAL | Age: 89
LOS: 1 days | Discharge: HOME | End: 2024-12-06

## 2024-12-06 DIAGNOSIS — Z90.49 ACQUIRED ABSENCE OF OTHER SPECIFIED PARTS OF DIGESTIVE TRACT: Chronic | ICD-10-CM

## 2024-12-06 DIAGNOSIS — C50.911 MALIGNANT NEOPLASM OF UNSPECIFIED SITE OF RIGHT FEMALE BREAST: ICD-10-CM

## 2024-12-06 DIAGNOSIS — Z98.890 OTHER SPECIFIED POSTPROCEDURAL STATES: Chronic | ICD-10-CM

## 2024-12-06 DIAGNOSIS — Z98.49 CATARACT EXTRACTION STATUS, UNSPECIFIED EYE: Chronic | ICD-10-CM

## 2024-12-13 ENCOUNTER — APPOINTMENT (OUTPATIENT)
Dept: CARDIOLOGY | Facility: CLINIC | Age: 89
End: 2024-12-13
Payer: MEDICARE

## 2024-12-13 PROCEDURE — 93306 TTE W/DOPPLER COMPLETE: CPT

## 2024-12-13 PROCEDURE — 93880 EXTRACRANIAL BILAT STUDY: CPT

## 2024-12-13 RX ORDER — PERFLUTREN 6.52 MG/ML
6.52 INJECTION, SUSPENSION INTRAVENOUS
Qty: 2 | Refills: 0 | Status: COMPLETED | OUTPATIENT
Start: 2024-12-13

## 2024-12-17 ENCOUNTER — APPOINTMENT (OUTPATIENT)
Dept: HEMATOLOGY ONCOLOGY | Facility: CLINIC | Age: 89
End: 2024-12-17

## 2024-12-18 NOTE — ASU PATIENT PROFILE, ADULT - PSH
: Yes H/O bilateral breast biopsy  1995 benign  H/O colonoscopy with polypectomy    History of bladder surgery  sling 2016  History of cataract surgery  b/l  History of esophagogastroduodenoscopy (EGD)    S/P appendectomy    S/P carpal tunnel release  right hand 10/2016  S/P coronary angiogram  catherization done 8/12/2016 no stents

## 2024-12-20 ENCOUNTER — APPOINTMENT (OUTPATIENT)
Dept: CARDIOLOGY | Facility: CLINIC | Age: 89
End: 2024-12-20
Payer: MEDICARE

## 2024-12-20 ENCOUNTER — APPOINTMENT (OUTPATIENT)
Dept: CARDIOLOGY | Facility: CLINIC | Age: 89
End: 2024-12-20

## 2024-12-20 ENCOUNTER — NON-APPOINTMENT (OUTPATIENT)
Age: 89
End: 2024-12-20

## 2024-12-20 VITALS — DIASTOLIC BLOOD PRESSURE: 80 MMHG | SYSTOLIC BLOOD PRESSURE: 124 MMHG

## 2024-12-20 VITALS
HEART RATE: 66 BPM | BODY MASS INDEX: 33.77 KG/M2 | HEIGHT: 60 IN | WEIGHT: 172 LBS | DIASTOLIC BLOOD PRESSURE: 72 MMHG | SYSTOLIC BLOOD PRESSURE: 140 MMHG | RESPIRATION RATE: 16 BRPM

## 2024-12-20 DIAGNOSIS — G47.33 OBSTRUCTIVE SLEEP APNEA (ADULT) (PEDIATRIC): ICD-10-CM

## 2024-12-20 DIAGNOSIS — R00.2 PALPITATIONS: ICD-10-CM

## 2024-12-20 DIAGNOSIS — R01.1 CARDIAC MURMUR, UNSPECIFIED: ICD-10-CM

## 2024-12-20 DIAGNOSIS — I10 ESSENTIAL (PRIMARY) HYPERTENSION: ICD-10-CM

## 2024-12-20 DIAGNOSIS — R94.31 ABNORMAL ELECTROCARDIOGRAM [ECG] [EKG]: ICD-10-CM

## 2024-12-20 DIAGNOSIS — E78.00 PURE HYPERCHOLESTEROLEMIA, UNSPECIFIED: ICD-10-CM

## 2024-12-20 PROCEDURE — 99214 OFFICE O/P EST MOD 30 MIN: CPT

## 2024-12-20 PROCEDURE — 93000 ELECTROCARDIOGRAM COMPLETE: CPT | Mod: 59

## 2024-12-20 PROCEDURE — 93288 INTERROG EVL PM/LDLS PM IP: CPT

## 2025-04-08 ENCOUNTER — NON-APPOINTMENT (OUTPATIENT)
Age: 89
End: 2025-04-08

## 2025-04-08 ENCOUNTER — APPOINTMENT (OUTPATIENT)
Dept: CARDIOLOGY | Facility: CLINIC | Age: 89
End: 2025-04-08

## 2025-04-08 VITALS
WEIGHT: 156 LBS | SYSTOLIC BLOOD PRESSURE: 142 MMHG | HEIGHT: 60 IN | DIASTOLIC BLOOD PRESSURE: 68 MMHG | BODY MASS INDEX: 30.63 KG/M2 | RESPIRATION RATE: 16 BRPM | HEART RATE: 61 BPM

## 2025-04-08 DIAGNOSIS — E78.00 PURE HYPERCHOLESTEROLEMIA, UNSPECIFIED: ICD-10-CM

## 2025-04-08 DIAGNOSIS — I10 ESSENTIAL (PRIMARY) HYPERTENSION: ICD-10-CM

## 2025-04-08 DIAGNOSIS — R01.1 CARDIAC MURMUR, UNSPECIFIED: ICD-10-CM

## 2025-04-08 DIAGNOSIS — R60.0 LOCALIZED EDEMA: ICD-10-CM

## 2025-04-08 DIAGNOSIS — R94.31 ABNORMAL ELECTROCARDIOGRAM [ECG] [EKG]: ICD-10-CM

## 2025-04-08 PROCEDURE — 99214 OFFICE O/P EST MOD 30 MIN: CPT | Mod: 25

## 2025-04-08 PROCEDURE — 93000 ELECTROCARDIOGRAM COMPLETE: CPT

## 2025-04-10 ENCOUNTER — NON-APPOINTMENT (OUTPATIENT)
Age: 89
End: 2025-04-10

## 2025-04-10 ENCOUNTER — APPOINTMENT (OUTPATIENT)
Dept: NEUROLOGY | Facility: CLINIC | Age: 89
End: 2025-04-10

## 2025-06-02 ENCOUNTER — APPOINTMENT (OUTPATIENT)
Dept: CARDIOLOGY | Facility: CLINIC | Age: 89
End: 2025-06-02
Payer: MEDICARE

## 2025-06-02 PROCEDURE — 93296 REM INTERROG EVL PM/IDS: CPT

## 2025-06-02 PROCEDURE — 93294 REM INTERROG EVL PM/LDLS PM: CPT

## 2025-06-17 ENCOUNTER — APPOINTMENT (OUTPATIENT)
Dept: CARDIOLOGY | Facility: CLINIC | Age: 89
End: 2025-06-17
Payer: MEDICARE

## 2025-06-17 PROCEDURE — 93288 INTERROG EVL PM/LDLS PM IP: CPT

## 2025-08-26 ENCOUNTER — APPOINTMENT (OUTPATIENT)
Dept: CARDIOLOGY | Facility: CLINIC | Age: 89
End: 2025-08-26
Payer: MEDICARE

## 2025-08-26 VITALS
SYSTOLIC BLOOD PRESSURE: 124 MMHG | BODY MASS INDEX: 32.59 KG/M2 | WEIGHT: 166 LBS | HEART RATE: 63 BPM | HEIGHT: 60 IN | DIASTOLIC BLOOD PRESSURE: 72 MMHG

## 2025-08-26 DIAGNOSIS — R07.89 OTHER CHEST PAIN: ICD-10-CM

## 2025-08-26 DIAGNOSIS — G47.33 OBSTRUCTIVE SLEEP APNEA (ADULT) (PEDIATRIC): ICD-10-CM

## 2025-08-26 DIAGNOSIS — R94.31 ABNORMAL ELECTROCARDIOGRAM [ECG] [EKG]: ICD-10-CM

## 2025-08-26 DIAGNOSIS — I10 ESSENTIAL (PRIMARY) HYPERTENSION: ICD-10-CM

## 2025-08-26 DIAGNOSIS — R01.1 CARDIAC MURMUR, UNSPECIFIED: ICD-10-CM

## 2025-08-26 PROCEDURE — 99214 OFFICE O/P EST MOD 30 MIN: CPT | Mod: 25

## 2025-08-26 PROCEDURE — 93000 ELECTROCARDIOGRAM COMPLETE: CPT
